# Patient Record
Sex: FEMALE | Race: WHITE | Employment: FULL TIME | URBAN - METROPOLITAN AREA
[De-identification: names, ages, dates, MRNs, and addresses within clinical notes are randomized per-mention and may not be internally consistent; named-entity substitution may affect disease eponyms.]

---

## 2019-02-19 ENCOUNTER — OFFICE VISIT (OUTPATIENT)
Dept: PRIMARY CARE CLINIC | Age: 49
End: 2019-02-19

## 2019-02-19 VITALS
DIASTOLIC BLOOD PRESSURE: 72 MMHG | BODY MASS INDEX: 38.73 KG/M2 | WEIGHT: 241 LBS | RESPIRATION RATE: 12 BRPM | SYSTOLIC BLOOD PRESSURE: 121 MMHG | HEIGHT: 66 IN | HEART RATE: 68 BPM | TEMPERATURE: 98 F

## 2019-02-19 DIAGNOSIS — Z23 NEED FOR TETANUS, DIPHTHERIA, AND ACELLULAR PERTUSSIS (TDAP) VACCINE: Primary | ICD-10-CM

## 2022-05-21 ENCOUNTER — HOSPITAL ENCOUNTER (INPATIENT)
Age: 52
LOS: 1 days | Discharge: HOME | DRG: 603 | End: 2022-05-23
Attending: EMERGENCY MEDICINE | Admitting: STUDENT IN AN ORGANIZED HEALTH CARE EDUCATION/TRAINING PROGRAM
Payer: COMMERCIAL
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
## 2022-05-21 DIAGNOSIS — L08.9 INFECTED DOG BITE: Primary | ICD-10-CM

## 2022-05-21 DIAGNOSIS — L02.415 ABSCESS OF RIGHT THIGH: ICD-10-CM

## 2022-05-21 PROBLEM — L02.91 ABSCESS: Status: ACTIVE | Noted: 2022-05-21

## 2022-05-21 PROBLEM — L03.90 CELLULITIS: Status: ACTIVE | Noted: 2022-05-21

## 2022-05-21 LAB
A/G RATIO: 1.7 (ref 1.1–2.2)
ALBUMIN SERPL-MCNC: 4.6 G/DL (ref 3.4–5)
ALP BLD-CCNC: 84 U/L (ref 40–129)
ALT SERPL-CCNC: 11 U/L (ref 10–40)
ANION GAP SERPL CALCULATED.3IONS-SCNC: 15 MMOL/L (ref 3–16)
AST SERPL-CCNC: 14 U/L (ref 15–37)
BASOPHILS ABSOLUTE: 0 K/UL (ref 0–0.2)
BASOPHILS RELATIVE PERCENT: 0.4 %
BILIRUB SERPL-MCNC: 0.3 MG/DL (ref 0–1)
BUN BLDV-MCNC: 16 MG/DL (ref 7–20)
CALCIUM SERPL-MCNC: 9.4 MG/DL (ref 8.3–10.6)
CHLORIDE BLD-SCNC: 102 MMOL/L (ref 99–110)
CO2: 22 MMOL/L (ref 21–32)
CREAT SERPL-MCNC: 0.8 MG/DL (ref 0.6–1.1)
EOSINOPHILS ABSOLUTE: 0.1 K/UL (ref 0–0.6)
EOSINOPHILS RELATIVE PERCENT: 0.9 %
GFR AFRICAN AMERICAN: >60
GFR NON-AFRICAN AMERICAN: >60
GLUCOSE BLD-MCNC: 96 MG/DL (ref 70–99)
HCT VFR BLD CALC: 40.2 % (ref 36–48)
HEMOGLOBIN: 13.2 G/DL (ref 12–16)
LACTIC ACID: 0.8 MMOL/L (ref 0.4–2)
LYMPHOCYTES ABSOLUTE: 1.3 K/UL (ref 1–5.1)
LYMPHOCYTES RELATIVE PERCENT: 13.8 %
MCH RBC QN AUTO: 29 PG (ref 26–34)
MCHC RBC AUTO-ENTMCNC: 33 G/DL (ref 31–36)
MCV RBC AUTO: 88.1 FL (ref 80–100)
MONOCYTES ABSOLUTE: 0.6 K/UL (ref 0–1.3)
MONOCYTES RELATIVE PERCENT: 6.5 %
NEUTROPHILS ABSOLUTE: 7.4 K/UL (ref 1.7–7.7)
NEUTROPHILS RELATIVE PERCENT: 78.4 %
PDW BLD-RTO: 14.3 % (ref 12.4–15.4)
PLATELET # BLD: 211 K/UL (ref 135–450)
PMV BLD AUTO: 8 FL (ref 5–10.5)
POTASSIUM SERPL-SCNC: 3.8 MMOL/L (ref 3.5–5.1)
RBC # BLD: 4.56 M/UL (ref 4–5.2)
SODIUM BLD-SCNC: 139 MMOL/L (ref 136–145)
TOTAL PROTEIN: 7.3 G/DL (ref 6.4–8.2)
WBC # BLD: 9.5 K/UL (ref 4–11)

## 2022-05-21 PROCEDURE — 87040 BLOOD CULTURE FOR BACTERIA: CPT

## 2022-05-21 PROCEDURE — 87077 CULTURE AEROBIC IDENTIFY: CPT

## 2022-05-21 PROCEDURE — 87205 SMEAR GRAM STAIN: CPT

## 2022-05-21 PROCEDURE — 99285 EMERGENCY DEPT VISIT HI MDM: CPT

## 2022-05-21 PROCEDURE — 87070 CULTURE OTHR SPECIMN AEROBIC: CPT

## 2022-05-21 PROCEDURE — 6360000002 HC RX W HCPCS: Performed by: PHYSICIAN ASSISTANT

## 2022-05-21 PROCEDURE — 96365 THER/PROPH/DIAG IV INF INIT: CPT

## 2022-05-21 PROCEDURE — 83605 ASSAY OF LACTIC ACID: CPT

## 2022-05-21 PROCEDURE — 85025 COMPLETE CBC W/AUTO DIFF WBC: CPT

## 2022-05-21 PROCEDURE — 2500000003 HC RX 250 WO HCPCS: Performed by: PHYSICIAN ASSISTANT

## 2022-05-21 PROCEDURE — 2580000003 HC RX 258: Performed by: PHYSICIAN ASSISTANT

## 2022-05-21 PROCEDURE — 80053 COMPREHEN METABOLIC PANEL: CPT

## 2022-05-21 PROCEDURE — 0H9HXZZ DRAINAGE OF RIGHT UPPER LEG SKIN, EXTERNAL APPROACH: ICD-10-PCS | Performed by: EMERGENCY MEDICINE

## 2022-05-21 PROCEDURE — 36415 COLL VENOUS BLD VENIPUNCTURE: CPT

## 2022-05-21 RX ORDER — 0.9 % SODIUM CHLORIDE 0.9 %
30 INTRAVENOUS SOLUTION INTRAVENOUS ONCE
Status: COMPLETED | OUTPATIENT
Start: 2022-05-21 | End: 2022-05-21

## 2022-05-21 RX ADMIN — SODIUM CHLORIDE 3525 ML: 9 INJECTION, SOLUTION INTRAVENOUS at 21:52

## 2022-05-21 RX ADMIN — SODIUM CHLORIDE 3000 MG: 900 INJECTION INTRAVENOUS at 21:54

## 2022-05-21 RX ADMIN — LIDOCAINE HYDROCHLORIDE 5 ML: 10 INJECTION, SOLUTION EPIDURAL; INFILTRATION; INTRACAUDAL; PERINEURAL at 22:36

## 2022-05-21 ASSESSMENT — PAIN DESCRIPTION - ORIENTATION
ORIENTATION: RIGHT;INNER
ORIENTATION: RIGHT;UPPER

## 2022-05-21 ASSESSMENT — PAIN SCALES - GENERAL
PAINLEVEL_OUTOF10: 3
PAINLEVEL_OUTOF10: 3

## 2022-05-21 ASSESSMENT — PAIN DESCRIPTION - LOCATION
LOCATION: LEG
LOCATION: LEG

## 2022-05-21 ASSESSMENT — PAIN - FUNCTIONAL ASSESSMENT: PAIN_FUNCTIONAL_ASSESSMENT: 0-10

## 2022-05-21 ASSESSMENT — PAIN DESCRIPTION - DESCRIPTORS: DESCRIPTORS: ACHING

## 2022-05-22 LAB
ANION GAP SERPL CALCULATED.3IONS-SCNC: 11 MMOL/L (ref 3–16)
BASOPHILS ABSOLUTE: 0 K/UL (ref 0–0.2)
BASOPHILS RELATIVE PERCENT: 0.3 %
BUN BLDV-MCNC: 10 MG/DL (ref 7–20)
CALCIUM SERPL-MCNC: 8.5 MG/DL (ref 8.3–10.6)
CHLORIDE BLD-SCNC: 108 MMOL/L (ref 99–110)
CO2: 23 MMOL/L (ref 21–32)
CREAT SERPL-MCNC: 0.6 MG/DL (ref 0.6–1.1)
EOSINOPHILS ABSOLUTE: 0.1 K/UL (ref 0–0.6)
EOSINOPHILS RELATIVE PERCENT: 1.2 %
GFR AFRICAN AMERICAN: >60
GFR NON-AFRICAN AMERICAN: >60
GLUCOSE BLD-MCNC: 88 MG/DL (ref 70–99)
HCT VFR BLD CALC: 34.2 % (ref 36–48)
HEMOGLOBIN: 11.5 G/DL (ref 12–16)
LYMPHOCYTES ABSOLUTE: 1.1 K/UL (ref 1–5.1)
LYMPHOCYTES RELATIVE PERCENT: 16.2 %
MAGNESIUM: 2.1 MG/DL (ref 1.8–2.4)
MCH RBC QN AUTO: 29.4 PG (ref 26–34)
MCHC RBC AUTO-ENTMCNC: 33.5 G/DL (ref 31–36)
MCV RBC AUTO: 87.8 FL (ref 80–100)
MONOCYTES ABSOLUTE: 0.6 K/UL (ref 0–1.3)
MONOCYTES RELATIVE PERCENT: 8.3 %
NEUTROPHILS ABSOLUTE: 4.9 K/UL (ref 1.7–7.7)
NEUTROPHILS RELATIVE PERCENT: 74 %
PDW BLD-RTO: 14.2 % (ref 12.4–15.4)
PLATELET # BLD: 176 K/UL (ref 135–450)
PMV BLD AUTO: 7.9 FL (ref 5–10.5)
POTASSIUM SERPL-SCNC: 3.9 MMOL/L (ref 3.5–5.1)
RBC # BLD: 3.9 M/UL (ref 4–5.2)
SODIUM BLD-SCNC: 142 MMOL/L (ref 136–145)
WBC # BLD: 6.6 K/UL (ref 4–11)

## 2022-05-22 PROCEDURE — 6360000002 HC RX W HCPCS: Performed by: INTERNAL MEDICINE

## 2022-05-22 PROCEDURE — 10060 I&D ABSCESS SIMPLE/SINGLE: CPT

## 2022-05-22 PROCEDURE — 6360000002 HC RX W HCPCS: Performed by: STUDENT IN AN ORGANIZED HEALTH CARE EDUCATION/TRAINING PROGRAM

## 2022-05-22 PROCEDURE — 85025 COMPLETE CBC W/AUTO DIFF WBC: CPT

## 2022-05-22 PROCEDURE — 1200000000 HC SEMI PRIVATE

## 2022-05-22 PROCEDURE — 2580000003 HC RX 258: Performed by: STUDENT IN AN ORGANIZED HEALTH CARE EDUCATION/TRAINING PROGRAM

## 2022-05-22 PROCEDURE — G0378 HOSPITAL OBSERVATION PER HR: HCPCS

## 2022-05-22 PROCEDURE — 80048 BASIC METABOLIC PNL TOTAL CA: CPT

## 2022-05-22 PROCEDURE — 94760 N-INVAS EAR/PLS OXIMETRY 1: CPT

## 2022-05-22 PROCEDURE — 36415 COLL VENOUS BLD VENIPUNCTURE: CPT

## 2022-05-22 PROCEDURE — 83735 ASSAY OF MAGNESIUM: CPT

## 2022-05-22 RX ORDER — ONDANSETRON 2 MG/ML
4 INJECTION INTRAMUSCULAR; INTRAVENOUS EVERY 6 HOURS PRN
Status: DISCONTINUED | OUTPATIENT
Start: 2022-05-22 | End: 2022-05-23 | Stop reason: HOSPADM

## 2022-05-22 RX ORDER — ACETAMINOPHEN 325 MG/1
650 TABLET ORAL EVERY 6 HOURS PRN
Status: DISCONTINUED | OUTPATIENT
Start: 2022-05-22 | End: 2022-05-23 | Stop reason: HOSPADM

## 2022-05-22 RX ORDER — HYDROCODONE BITARTRATE AND ACETAMINOPHEN 7.5; 325 MG/1; MG/1
1 TABLET ORAL EVERY 6 HOURS PRN
Status: DISCONTINUED | OUTPATIENT
Start: 2022-05-22 | End: 2022-05-22

## 2022-05-22 RX ORDER — ONDANSETRON 2 MG/ML
4 INJECTION INTRAMUSCULAR; INTRAVENOUS EVERY 6 HOURS PRN
Status: DISCONTINUED | OUTPATIENT
Start: 2022-05-22 | End: 2022-05-22 | Stop reason: SDUPTHER

## 2022-05-22 RX ORDER — SODIUM CHLORIDE 0.9 % (FLUSH) 0.9 %
5-40 SYRINGE (ML) INJECTION PRN
Status: DISCONTINUED | OUTPATIENT
Start: 2022-05-22 | End: 2022-05-23 | Stop reason: HOSPADM

## 2022-05-22 RX ORDER — POLYETHYLENE GLYCOL 3350 17 G/17G
17 POWDER, FOR SOLUTION ORAL DAILY PRN
Status: DISCONTINUED | OUTPATIENT
Start: 2022-05-22 | End: 2022-05-23 | Stop reason: HOSPADM

## 2022-05-22 RX ORDER — HYDROCODONE BITARTRATE AND ACETAMINOPHEN 5; 325 MG/1; MG/1
1 TABLET ORAL EVERY 6 HOURS PRN
Status: DISCONTINUED | OUTPATIENT
Start: 2022-05-22 | End: 2022-05-23 | Stop reason: HOSPADM

## 2022-05-22 RX ORDER — SODIUM CHLORIDE 0.9 % (FLUSH) 0.9 %
5-40 SYRINGE (ML) INJECTION EVERY 12 HOURS SCHEDULED
Status: DISCONTINUED | OUTPATIENT
Start: 2022-05-22 | End: 2022-05-23 | Stop reason: HOSPADM

## 2022-05-22 RX ORDER — SODIUM CHLORIDE 9 MG/ML
INJECTION, SOLUTION INTRAVENOUS PRN
Status: DISCONTINUED | OUTPATIENT
Start: 2022-05-22 | End: 2022-05-23 | Stop reason: HOSPADM

## 2022-05-22 RX ORDER — ACETAMINOPHEN 650 MG/1
650 SUPPOSITORY RECTAL EVERY 6 HOURS PRN
Status: DISCONTINUED | OUTPATIENT
Start: 2022-05-22 | End: 2022-05-23 | Stop reason: HOSPADM

## 2022-05-22 RX ORDER — ENOXAPARIN SODIUM 100 MG/ML
30 INJECTION SUBCUTANEOUS 2 TIMES DAILY
Status: DISCONTINUED | OUTPATIENT
Start: 2022-05-22 | End: 2022-05-23 | Stop reason: HOSPADM

## 2022-05-22 RX ADMIN — AMPICILLIN AND SULBACTAM 3000 MG: 1; 2 INJECTION, POWDER, FOR SOLUTION INTRAMUSCULAR; INTRAVENOUS at 03:35

## 2022-05-22 RX ADMIN — AMPICILLIN AND SULBACTAM 3000 MG: 1; 2 INJECTION, POWDER, FOR SOLUTION INTRAMUSCULAR; INTRAVENOUS at 10:30

## 2022-05-22 RX ADMIN — AMPICILLIN AND SULBACTAM 3000 MG: 1; 2 INJECTION, POWDER, FOR SOLUTION INTRAMUSCULAR; INTRAVENOUS at 17:19

## 2022-05-22 RX ADMIN — SODIUM CHLORIDE 25 ML: 9 INJECTION, SOLUTION INTRAVENOUS at 10:29

## 2022-05-22 RX ADMIN — ENOXAPARIN SODIUM 30 MG: 100 INJECTION SUBCUTANEOUS at 20:27

## 2022-05-22 RX ADMIN — Medication 1250 MG: at 11:47

## 2022-05-22 RX ADMIN — Medication 1250 MG: at 23:53

## 2022-05-22 RX ADMIN — Medication 10 ML: at 20:28

## 2022-05-22 RX ADMIN — Medication 10 ML: at 10:25

## 2022-05-22 RX ADMIN — ENOXAPARIN SODIUM 30 MG: 100 INJECTION SUBCUTANEOUS at 10:24

## 2022-05-22 RX ADMIN — SODIUM CHLORIDE 25 ML: 9 INJECTION, SOLUTION INTRAVENOUS at 17:17

## 2022-05-22 RX ADMIN — AMPICILLIN AND SULBACTAM 3000 MG: 1; 2 INJECTION, POWDER, FOR SOLUTION INTRAMUSCULAR; INTRAVENOUS at 23:00

## 2022-05-22 ASSESSMENT — ENCOUNTER SYMPTOMS: COLOR CHANGE: 1

## 2022-05-22 ASSESSMENT — PAIN SCALES - GENERAL
PAINLEVEL_OUTOF10: 0
PAINLEVEL_OUTOF10: 0

## 2022-05-22 NOTE — PROGRESS NOTES
Hospitalist Progress Note      PCP: No primary care provider on file. Date of Admission: 5/21/2022        Subjective: No fever or chills still having thigh pain, no nausea vomiting dizziness. Medications:  Reviewed    Infusion Medications    sodium chloride       Scheduled Medications    sodium chloride flush  5-40 mL IntraVENous 2 times per day    enoxaparin  30 mg SubCUTAneous BID    ampicillin-sulbactam  3,000 mg IntraVENous Q6H     PRN Meds: sodium chloride flush, sodium chloride, polyethylene glycol, acetaminophen **OR** acetaminophen, ondansetron, HYDROcodone 5 mg - acetaminophen      Intake/Output Summary (Last 24 hours) at 5/22/2022 0920  Last data filed at 5/22/2022 0916  Gross per 24 hour   Intake 0 ml   Output --   Net 0 ml       Physical Exam Performed:    /65   Pulse 80   Temp 98.5 °F (36.9 °C) (Oral)   Resp 18   Wt 265 lb 3.4 oz (120.3 kg)   SpO2 95%   BMI 42.81 kg/m²     General appearance: No apparent distress  Neck: Supple  Respiratory:  Normal respiratory effort. Clear to auscultation, bilaterally without Rales/Wheezes/Rhonchi. Cardiovascular: Regular rate and rhythm with normal S1/S2 without murmurs, rubs or gallops. Abdomen: Soft, non-tender, non-distended   Musculoskeletal: No clubbing, cyanosis. Edema and erythema of medial aspect of right thigh serous drainage no bloody drainage.   Skin: As above  Neurologic: No focal weakness  Psychiatric: Alert and oriented, thought content appropriate, normal insight  Capillary Refill: Brisk,3 seconds, normal   Peripheral Pulses: +2 palpable, equal bilaterally       Labs:   Recent Labs     05/21/22 2122 05/22/22  0505   WBC 9.5 6.6   HGB 13.2 11.5*   HCT 40.2 34.2*    176     Recent Labs     05/21/22 2122 05/22/22  0504    142   K 3.8 3.9    108   CO2 22 23   BUN 16 10   CREATININE 0.8 0.6   CALCIUM 9.4 8.5     Recent Labs     05/21/22 2122   AST 14*   ALT 11   BILITOT 0.3   ALKPHOS 84     No results for

## 2022-05-22 NOTE — H&P
Hospital Medicine History & Physical      PCP: No primary care provider on file. Date of Admission: 5/21/2022    Date of Service: Pt seen/examined on 5/21/2022 and placed observation    Chief Complaint: Right medial thigh erythema and swelling, worsening recently      History Of Present Illness: The patient is a 45 y.o. female with no major past medical history who presents to [facility] with worsening right medial thigh swelling and erythema as well as area that seems to be more protuberant than previous that she notes was initially caused by a dog bite from her home dog that she notes was not done intentionally and was just an accident about 2 or 3 weeks ago. She notes that the area did get inflamed and red at the time and did potentially start draining pus at the time but she treated it with home dose of Keflex which she had without a prescription at home. She notes that the area did improve over the course of time for a week or so but then more recently in the last 2 to 3 days she has noticed increasing swelling again and erythema to the area and she is also noticed some areas where it started to become more protuberant of concern for an abscess to her. Otherwise in the last few weeks she has not had any other recent symptoms of fever, chills, dizziness, syncope, chest pain, shortness of breath, dysuria, blood in urine/stool/BM, nausea/vomiting/diarrhea/abdominal pain. Past Medical History:    History reviewed. No pertinent past medical history. Past Surgical History:        Procedure Laterality Date    HYSTERECTOMY         Medications Prior to Admission:    Prior to Admission medications    Not on File       Allergies:  Patient has no known allergies.     Social History:  The patient currently lives home    TOBACCO:   reports that she has never smoked. She has never used smokeless tobacco.  ETOH:   reports current alcohol use. Family History:  Reviewed in detail and negative for DM, Early CAD, Cancer, CVA. Positive as follows:    History reviewed. No pertinent family history. REVIEW OF SYSTEMS:   as noted in the HPI. All other systems reviewed and negative. PHYSICAL EXAM:    /65   Pulse 80   Temp 98.5 °F (36.9 °C) (Oral)   Resp 18   Wt 265 lb 3.4 oz (120.3 kg)   SpO2 95%   BMI 42.81 kg/m²     General appearance: Alert and oriented x4, no acute respiratory distress, pleasant, conversational, still having pain to the leg  HEENT Normal cephalic, atraumatic without obvious deformity. Pupils equal, round, and reactive to light. Extra ocular muscles intact. Conjunctivae/corneas clear. Moist mucous membrane  Neck: Supple, no JVD  Lungs: Clear to auscultation, bilaterally without Rales/Wheezes/Rhonchi with good respiratory effort. Heart: Regular rate and rhythm with Normal S1/S2 without murmurs, rubs or gallops, point of maximum impulse non-displaced  Abdomen: Soft, non-tender or non-distended without rigidity or guarding and positive bowel sounds all four quadrants. Extremities: No lower extremity edema noted  Skin: To the right medial thigh there is circumferential erythema that seems to be improving and 2 areas of puncture wounds were noted that where the previous abscess drainage sites and they do have some sterile packing in place. Does not seem to be any excess pus draining out of the area and overall seems to be improving  Neurologic: Grossly intact neurologically, 5 of 5 strength all extremities  Mental status: Alert, oriented, thought content appropriate.   Capillary Refill: Acceptable  < 3 seconds  Peripheral Pulses: +3 Easily felt, not easily obliterated with pressure          CBC   Recent Labs     05/21/22 2122 05/22/22  0505   WBC 9.5 6.6   HGB 13.2 11.5*   HCT 40.2 34.2*    176      RENAL  Recent Labs 05/21/22 2122 05/22/22  0504    142   K 3.8 3.9    108   CO2 22 23   BUN 16 10   CREATININE 0.8 0.6     LFT'S  Recent Labs     05/21/22 2122   AST 14*   ALT 11   BILITOT 0.3   ALKPHOS 84     COAG  No results for input(s): INR in the last 72 hours. CARDIAC ENZYMES  No results for input(s): CKTOTAL, CKMB, CKMBINDEX, TROPONINI in the last 72 hours. U/A:  No results found for: NITRITE, COLORU, WBCUA, RBCUA, MUCUS, BACTERIA, CLARITYU, SPECGRAV, LEUKOCYTESUR, BLOODU, GLUCOSEU, AMORPHOUS    ABG  No results found for: [place], BEART, W2UPSDYM, PHART, THGBART, Leeta Larch, [place]        Active Hospital Problems    Diagnosis Date Noted    Cellulitis [L03.90] 05/21/2022     Priority: Medium    Dog bite [N00. 0XXA] 05/21/2022     Priority: Medium    Abscess [L02.91] 05/21/2022     Priority: Medium         PHYSICIANS CERTIFICATION:    I certify that Shyanne Hilliard is expected to be hospitalized for less than 2 midnights based on the following assessment and plan:      ASSESSMENT/PLAN:  · Cellulitis  · Abscess  · Dog bite    Plan:  · Abscess was drained in the ED although there is some concern for persistent cellulitis and potentially a deeper abscess  · Start patient on Unasyn IV  · Keeping patient n.p.o.  · General surgery to evaluate in the morning to consider possibly further abscess debridement  · Wound cultures were obtained from the abscess drainage  · Norco as needed for superficial pain to the abscess site  · Repeat labs daily    DVT Prophylaxis: Lovenox  Diet: Diet NPO Exceptions are: Ice Chips, Sips of Water with Meds  Code Status: Full Code  PT/OT Eval Status: Ambulatory    Dispo -pending clinical course       Alton Gruber Estimable, DO    Thank you No primary care provider on file. for the opportunity to be involved in this patient's care. If you have any questions or concerns please feel free to contact me at [phone].

## 2022-05-22 NOTE — PROGRESS NOTES
Clinical Pharmacy Note  Vancomycin Consult    Jose M Boo is a 45 y.o. female ordered Vancomycin for Skin/soft tissue; consult received from Dr. Roman Valdez to manage therapy. Also receiving Unasyn. Patient Active Problem List   Diagnosis    Cellulitis    Dog bite    Abscess       Allergies:  Patient has no known allergies. Temp max:  Temp (24hrs), Av.2 °F (36.8 °C), Min:98 °F (36.7 °C), Max:98.5 °F (36.9 °C)      Recent Labs     22  0505   WBC 9.5 6.6       Recent Labs     22  0504   BUN 16 10   CREATININE 0.8 0.6         Intake/Output Summary (Last 24 hours) at 2022 1021  Last data filed at 2022 7192  Gross per 24 hour   Intake 0 ml   Output --   Net 0 ml       Culture Results:  Wound + Blood Cultures ordered       Ht Readings from Last 1 Encounters:   19 5' 6\" (1.676 m)        Wt Readings from Last 1 Encounters:   22 265 lb 3.4 oz (120.3 kg)         CrCl cannot be calculated (Unknown ideal weight. ). Assessment/Plan:  Vancomycin 1250 mg IV every 12 hours ordered. Regimen projects a trough level of 10-15 mg/L. Calculated   Trough 12.6 ug/mL  Level ordered for 22 1000. Thank you for the consult.    Seb Saab, [facility]

## 2022-05-22 NOTE — ED PROVIDER NOTES
I have personally performed a face to face diagnostic evaluation on this patient. I have fully participated in the care of this patient I personally saw the patient and performed a substantive portion of the visit including all aspects of the medical decision making. I have reviewed and agree with all pertinent clinical information including history, physical exam, diagnostic tests, and the plan. HPI: Felicia Ambriz presented with right inner thigh wound from a dog bite that occurred approximately 6 weeks ago now getting swollen and painful with some redness. Bloody drainage no purulence. Was taking Keflex at home for home treatment but has not seen medical care at this time. No other systemic symptoms. See CAMERON note for further history  Chief Complaint   Patient presents with    Wound Check     right inner thigh leg wound from pt dog bite on 4/16 that was self treated at home and last night pt noticed it was gettin gswollen and painful      Review of Systems: See CAMERON note  Vital Signs: BP (!) 152/98   Pulse 108   Temp 98.4 °F (36.9 °C) (Oral)   Resp 16   Wt 259 lb 0.7 oz (117.5 kg)   SpO2 100%   BMI 41.81 kg/m²     Alert 45 y.o. female who does not appear toxic or acutely ill  HENT: Atraumatic, oral mucosa moist  Neck: Grossly normal ROM  Chest/Lungs: respiratory effort normal   Abdomen: Soft nontender  Extremities: 2+ radial DP and PT bilaterally  Musculoskeletal: Grossly normal ROM  Skin: No palor or diaphoresis, large area of erythema in right thigh with multiple areas of fluctuance and induration expanding around the previously marked area significant tenderness    Medical Decision Making and Plan:  Pertinent Labs & Imaging studies reviewed. (See CAMERON chart for details)  I agree with CAMERON assessment and plan. Side ultrasound shows multiple abscess pockets which will be opened and drained. See CAMERON note for further details on procedures.   Patient's white blood cell count and lactate is normal however the erythema on her right inner thigh is rapidly expanding. No concern for necrotizing fasciitis at this time however patient with worsening cellulitis and abscess will admit for IV antibiotics and further observation.          Reed Mcginnis MD  05/21/22 9873

## 2022-05-22 NOTE — PROGRESS NOTES
General Surgery    Asked to see for cellulitis and abscess right thigh  Due to dog bite    I&D done in ER last night    Continue IV antibiotics  Will check in AM    If surgical intervention is required we can probably delay until Tuesday to allow her to attend her daughter's graduation Monday evening    NPO after MN in case more urgent intervention is needed    Electronically signed by Arlyn Jimenez MD on 5/22/2022 at 10:25 AM

## 2022-05-22 NOTE — ED PROVIDER NOTES
[address] [facility]      Pt Name: Ramonita Almaraz  MRN: [id number]  [place] 1970  Date of evaluation: 5/21/2022  Provider: NISA Watt    This patient was seen and evaluated by the attending physician Ramo Watts MD.    [address] [place] [address]       Chief Complaint   Patient presents with   Elena Wound Check     right inner thigh leg wound from pt dog bite on 4/16 that was self treated at home and last night pt noticed it was gettin gswollen and painful       CRITICAL CARE TIME   I performed a total Critical Care time of 31 minutes, excluding separately reportable procedures. There was a high probability of clinically significant/life threatening deterioration in the patient's condition which required my urgent intervention. Not limited to multiple reexaminations, discussions with attending physician and consultants. HISTORY OF PRESENT ILLNESS  (Location/Symptom, Timing/Onset, Context/Setting, Quality, Duration, Modifying Factors, Severity.)   Ramonita Almaraz is a 45 y.o. female who presents to the emergency department with an infection and wound to her right upper medial thigh. She tells me 5 weeks ago she was stepping over a gate in her home when her 1 dog lunged at her other dog and ended up biting her in the thigh. She states there was 1 long wound in the thigh and she treated at home with heat, soapy scrubs and Keflex which she had 7 days worth of and took. She states initially it seemed to be significantly improving the wound was closing. There was an area that was draining some bloody clear fluid underneath the actual dog bite she states it stopped draining and then yesterday she noticed some redness that got rapidly worse this afternoon with swelling. She is not a diabetic she states her tetanus is up-to-date and her dog is up-to-date on immunizations. She denies chronic medical problems. No fevers no vomiting.   Took some ibuprofen earlier for the pain that she rates at 3 out of 10. Nursing Notes were reviewed and I agree. REVIEW OF SYSTEMS    (2-9 systems for level 4, 10 or more for level 5)     Review of Systems   Constitutional: Negative for fever. Musculoskeletal: Positive for arthralgias. Skin: Positive for color change and wound. Neurological: Negative for weakness and numbness. Psychiatric/Behavioral: Negative for agitation, behavioral problems and confusion. Except as noted above the remainder of the review of systems was reviewed and negative. PAST MEDICAL HISTORY   History reviewed. No pertinent past medical history. SURGICAL HISTORY           Procedure Laterality Date    HYSTERECTOMY         CURRENT MEDICATIONS       Previous Medications    No medications on file       ALLERGIES     Patient has no known allergies. FAMILY HISTORY     History reviewed. No pertinent family history. No family status information on file. SOCIAL HISTORY      reports that she has never smoked. She has never used smokeless tobacco. She reports current alcohol use. She reports that she does not use drugs. PHYSICAL EXAM    (up to 7 for level 4, 8 or more for level 5)     ED Triage Vitals [05/21/22 2046]   BP Temp Temp Source Pulse Resp SpO2 Height Weight   (!) 152/98 98.4 °F (36.9 °C) Oral 108 16 100 % -- 259 lb 0.7 oz (117.5 kg)       Physical Exam  Vitals and nursing note reviewed. Constitutional:       Appearance: Normal appearance. HENT:      Head: Normocephalic and atraumatic. Mouth/Throat:      Mouth: Mucous membranes are moist.   Eyes:      Pupils: Pupils are equal, round, and reactive to light. Cardiovascular:      Rate and Rhythm: Tachycardia present. Pulmonary:      Effort: Pulmonary effort is normal. No respiratory distress. Musculoskeletal:      Cervical back: Normal range of motion. Skin:     Findings: Erythema present.           Neurological:      General: No focal deficit present. Mental Status: She is alert and oriented to person, place, and time. Psychiatric:         Mood and Affect: Mood normal.         Behavior: Behavior normal.         DIAGNOSTIC RESULTS     NONE    LABS:  Labs Reviewed   COMPREHENSIVE METABOLIC PANEL - Abnormal; Notable for the following components:       Result Value    AST 14 (*)     All other components within normal limits   CULTURE, BLOOD 2   CULTURE, BLOOD 1   CULTURE, WOUND   CBC WITH AUTO DIFFERENTIAL   LACTIC ACID       All other labs were within normal range or not returned as of this dictation. EMERGENCY DEPARTMENT COURSE and DIFFERENTIAL DIAGNOSIS/MDM:   Vitals:    Vitals:    05/21/22 2046 05/22/22 0000   BP: (!) 152/98 139/89   Pulse: 108 98   Resp: 16 16   Temp: 98.4 °F (36.9 °C) 98 °F (36.7 °C)   TempSrc: Oral Oral   SpO2: 100% 99%   Weight: 259 lb 0.7 oz (117.5 kg)      Is afebrile initially tachycardic at 108 on recheck is not tachycardic. She has a rapidly worsening abscess and cellulitis to the right inner thigh. It got better over the past 5 weeks and then got much worse since yesterday she states really since this afternoon got much more swollen and red. It was anesthetized and opened into areas with packing placed. Moderate to large amount of purulent drainage. We will send for culture. White count and lactic are not elevated. No involvement of the genitalia. Not a diabetic. Will consult hospitalist regarding admission. She is given IV Unasyn in the ER. Patient is agreeable. CONSULTS:  IP CONSULT TO HOSPITALIST  IP CONSULT TO HOSPITALIST  IP CONSULT TO GENERAL SURGERY    PROCEDURES:  Incision/Drainage    Date/Time: 5/22/2022 12:35 AM  Performed by: NISA Kerr  Authorized by:  Ashlee Hernández DO     Consent:     Consent obtained:  Verbal    Consent given by:  Patient  Location:     Type:  Abscess    Location:  Lower extremity    Lower extremity location:  Leg    Leg location:  R upper leg  Pre-procedure details:     Skin preparation:  Hibiclens  Anesthesia (see MAR for exact dosages): Anesthesia method:  Local infiltration    Local anesthetic:  Lidocaine 1% w/o epi  Procedure type:     Complexity:  Simple  Procedure details:     Incision types:  Single straight    Scalpel blade:  11    Wound management:  Probed and deloculated and irrigated with saline    Drainage:  Purulent    Drainage amount: Moderate    Packing materials:  1/4 in gauze  Post-procedure details:     Patient tolerance of procedure: Tolerated well, no immediate complications    Incision/Drainage    Date/Time: 5/22/2022 12:36 AM  Performed by: NISA Luis  Authorized by: Heather Lerner DO     Consent:     Consent obtained:  Verbal    Consent given by:  Patient  Location:     Type:  Abscess    Location:  Lower extremity    Lower extremity location:  Leg    Leg location:  R upper leg  Pre-procedure details:     Skin preparation:  Hibiclens  Anesthesia (see MAR for exact dosages): Anesthesia method:  Local infiltration    Local anesthetic:  Lidocaine 1% w/o epi  Procedure details:     Incision types:  Single straight    Scalpel blade:  11    Drainage:  Purulent    Drainage amount: Moderate    Packing materials:  1/4 in gauze  Post-procedure details:     Patient tolerance of procedure: Tolerated well, no immediate complications          FINAL IMPRESSION      1. Infected dog bite    2. Abscess of right thigh          DISPOSITION/PLAN   DISPOSITION Admitted 05/21/2022 11:53:21 PM      PATIENT REFERRED TO:  No follow-up provider specified.     DISCHARGE MEDICATIONS:  New Prescriptions    No medications on file       (Please note that portions of this note were completed with a voice recognition program.  Efforts were made to edit the dictations but occasionally words are mis-transcribed.)    Nohemy Luis, [id number]  05/22/22 0923

## 2022-05-22 NOTE — PLAN OF CARE
Problem: Discharge Planning  Goal: Discharge to home or other facility with appropriate resources  Outcome: Not Progressing  Note: Pt will be discharged to appropriate level of care. Plan is to return home. SW and medical team are following. Problem: Pain  Goal: Verbalizes/displays adequate comfort level or baseline comfort level  Outcome: Not Progressing  Note: Pt assessed for pain. Pt denies any pain at this time. Will continue to monitor pt and assess for pain throughout rest of shift.

## 2022-05-22 NOTE — PROGRESS NOTES
Patient admitted to room 3104 dx of Cellulitis from dog bite. Currently resting in bed. Alert and oriented X 4. Pt Up ad sagrario. Complaining of pain, PRN pain medication given per order (see MAR). IV capped and flushed. Assessment complete. All patient needs are met at this time. Call light is in reach.

## 2022-05-22 NOTE — PROGRESS NOTES
Pt A&O in the bed. Pt tolerating PO intake well. AM medications administered without complications. Pt has no complaints at this time. UAL in the room. Call light within reach. Able to make needs known. Fall precautions in place. Will monitor.  Electronically signed by Dayana Peterson RN on 5/22/2022 at 3:03 PM

## 2022-05-23 VITALS
WEIGHT: 265.21 LBS | HEART RATE: 65 BPM | RESPIRATION RATE: 16 BRPM | BODY MASS INDEX: 42.62 KG/M2 | OXYGEN SATURATION: 97 % | SYSTOLIC BLOOD PRESSURE: 116 MMHG | TEMPERATURE: 97.6 F | HEIGHT: 66 IN | DIASTOLIC BLOOD PRESSURE: 69 MMHG

## 2022-05-23 LAB
ANION GAP SERPL CALCULATED.3IONS-SCNC: 10 MMOL/L (ref 3–16)
BASOPHILS ABSOLUTE: 0 K/UL (ref 0–0.2)
BASOPHILS RELATIVE PERCENT: 0.4 %
BUN BLDV-MCNC: 7 MG/DL (ref 7–20)
CALCIUM SERPL-MCNC: 8.5 MG/DL (ref 8.3–10.6)
CHLORIDE BLD-SCNC: 108 MMOL/L (ref 99–110)
CO2: 23 MMOL/L (ref 21–32)
CREAT SERPL-MCNC: 0.6 MG/DL (ref 0.6–1.1)
EOSINOPHILS ABSOLUTE: 0.2 K/UL (ref 0–0.6)
EOSINOPHILS RELATIVE PERCENT: 3.6 %
GFR AFRICAN AMERICAN: >60
GFR NON-AFRICAN AMERICAN: >60
GLUCOSE BLD-MCNC: 87 MG/DL (ref 70–99)
HCT VFR BLD CALC: 35.4 % (ref 36–48)
HEMOGLOBIN: 11.8 G/DL (ref 12–16)
LYMPHOCYTES ABSOLUTE: 1.3 K/UL (ref 1–5.1)
LYMPHOCYTES RELATIVE PERCENT: 27.7 %
MAGNESIUM: 2.1 MG/DL (ref 1.8–2.4)
MCH RBC QN AUTO: 29.2 PG (ref 26–34)
MCHC RBC AUTO-ENTMCNC: 33.2 G/DL (ref 31–36)
MCV RBC AUTO: 87.9 FL (ref 80–100)
MONOCYTES ABSOLUTE: 0.5 K/UL (ref 0–1.3)
MONOCYTES RELATIVE PERCENT: 10 %
NEUTROPHILS ABSOLUTE: 2.8 K/UL (ref 1.7–7.7)
NEUTROPHILS RELATIVE PERCENT: 58.3 %
PDW BLD-RTO: 14.2 % (ref 12.4–15.4)
PLATELET # BLD: 191 K/UL (ref 135–450)
PMV BLD AUTO: 7.7 FL (ref 5–10.5)
POTASSIUM SERPL-SCNC: 3.8 MMOL/L (ref 3.5–5.1)
RBC # BLD: 4.03 M/UL (ref 4–5.2)
SODIUM BLD-SCNC: 141 MMOL/L (ref 136–145)
VANCOMYCIN TROUGH: 10.6 UG/ML (ref 10–20)
WBC # BLD: 4.7 K/UL (ref 4–11)

## 2022-05-23 PROCEDURE — 83735 ASSAY OF MAGNESIUM: CPT

## 2022-05-23 PROCEDURE — 99221 1ST HOSP IP/OBS SF/LOW 40: CPT | Performed by: INTERNAL MEDICINE

## 2022-05-23 PROCEDURE — 80202 ASSAY OF VANCOMYCIN: CPT

## 2022-05-23 PROCEDURE — 6360000002 HC RX W HCPCS: Performed by: STUDENT IN AN ORGANIZED HEALTH CARE EDUCATION/TRAINING PROGRAM

## 2022-05-23 PROCEDURE — 80048 BASIC METABOLIC PNL TOTAL CA: CPT

## 2022-05-23 PROCEDURE — 94760 N-INVAS EAR/PLS OXIMETRY 1: CPT

## 2022-05-23 PROCEDURE — 2580000003 HC RX 258: Performed by: STUDENT IN AN ORGANIZED HEALTH CARE EDUCATION/TRAINING PROGRAM

## 2022-05-23 PROCEDURE — 85025 COMPLETE CBC W/AUTO DIFF WBC: CPT

## 2022-05-23 PROCEDURE — 36415 COLL VENOUS BLD VENIPUNCTURE: CPT

## 2022-05-23 PROCEDURE — 6360000002 HC RX W HCPCS: Performed by: INTERNAL MEDICINE

## 2022-05-23 RX ORDER — AMOXICILLIN AND CLAVULANATE POTASSIUM 875; 125 MG/1; MG/1
1 TABLET, FILM COATED ORAL 2 TIMES DAILY
Qty: 28 TABLET | Refills: 0 | Status: SHIPPED | OUTPATIENT
Start: 2022-05-23 | End: 2022-06-06

## 2022-05-23 RX ADMIN — Medication 1250 MG: at 10:32

## 2022-05-23 RX ADMIN — SODIUM CHLORIDE 25 ML: 9 INJECTION, SOLUTION INTRAVENOUS at 08:53

## 2022-05-23 RX ADMIN — AMPICILLIN AND SULBACTAM 3000 MG: 1; 2 INJECTION, POWDER, FOR SOLUTION INTRAMUSCULAR; INTRAVENOUS at 05:00

## 2022-05-23 RX ADMIN — Medication 10 ML: at 08:51

## 2022-05-23 RX ADMIN — AMPICILLIN AND SULBACTAM 3000 MG: 1; 2 INJECTION, POWDER, FOR SOLUTION INTRAMUSCULAR; INTRAVENOUS at 08:55

## 2022-05-23 ASSESSMENT — PAIN SCALES - GENERAL: PAINLEVEL_OUTOF10: 0

## 2022-05-23 NOTE — CARE COORDINATION
INITIAL CASE MANAGEMENT ASSESSMENT     Reviewed chart, spoke with patient to assess possible discharge needs. Explained Case Management role/services. Living Situation: Verified demographics. Patient lives with spouse and 3 daughters in a house. Patient reports no concerns regarding mobility in the home. ADLs: Independent      DME: none     PT/OT Recs: not ordered; patient independent      Active Services: none      Transportation: Family transport at discharge      Medications: [place] [facility] [employer]     PCP: [facility] PCP        HD/PD: n/a     PLAN/COMMENTS: Return home with family. ACP Completed. Patient currently receiving IV antibiotics. - Per chart review, plan is to discharge with Oral antibiotics. SW/CM provided contact information for patient or family to call with any questions. SW/CM will follow and assist as needed.     NIKO Garcia, [facility] Social Work  [phone]  Electronically signed by NIKO Garcia, W on 5/23/2022 at 9:57 AM

## 2022-05-23 NOTE — PROGRESS NOTES
General surgery paged in regards to antibiotic recommendations for discharge per hospitalist request. Electronically signed by Melissa Maldonado RN on 5/23/2022 at 9:33 AM

## 2022-05-23 NOTE — PROGRESS NOTES
Progress Note  Date:2022       Room:W9U-8788/3104-01  Patient Name:Sherron Wright     YOB: 1970     Age:51 y.o. Subjective    Subjective:  Symptoms:  Improved. Diet:  Adequate intake. Activity level: Returning to normal.    Pain:  She complains of pain that is mild. Review of Systems  Objective         Vitals Last 24 Hours:  TEMPERATURE:  Temp  Av.2 °F (36.8 °C)  Min: 98 °F (36.7 °C)  Max: 98.6 °F (37 °C)  RESPIRATIONS RANGE: Resp  Av  Min: 16  Max: 18  PULSE OXIMETRY RANGE: SpO2  Av %  Min: 96 %  Max: 96 %  PULSE RANGE: Pulse  Av.3  Min: 61  Max: 88  BLOOD PRESSURE RANGE: Systolic (30GBJ), PDH:861 , Min:118 , THM:940   ; Diastolic (02ZDN), AEE:95, Min:68, Max:98    I/O (24Hr): Intake/Output Summary (Last 24 hours) at 2022 0948  Last data filed at 2022 9427  Gross per 24 hour   Intake 1348.6 ml   Output --   Net 1348.6 ml     Objective  Labs/Imaging/Diagnostics    Labs:  CBC:  Recent Labs     22  0505 22  0447   WBC 9.5 6.6 4.7   RBC 4.56 3.90* 4.03   HGB 13.2 11.5* 11.8*   HCT 40.2 34.2* 35.4*   MCV 88.1 87.8 87.9   RDW 14.3 14.2 14.2    176 191     CHEMISTRIES:  Recent Labs     22  0504 22  0447    142 141   K 3.8 3.9 3.8    108 108   CO2    BUN 16 10 7   CREATININE 0.8 0.6 0.6   GLUCOSE 96 88 87   MG  --  2.10 2.10     PT/INR:No results for input(s): PROTIME, INR in the last 72 hours. APTT:No results for input(s): APTT in the last 72 hours. LIVER PROFILE:  Recent Labs     22   AST 14*   ALT 11   BILITOT 0.3   ALKPHOS 84       Imaging Last 24 Hours:  No results found.   Assessment//Plan           Hospital Problems           Last Modified POA    * (Principal) Cellulitis 2022 Yes    Dog bite 2022 Yes    Abscess 2022 Yes        Assessment & Plan    Right thigh abscess   S/P I&D in ER   Improved overnight   Less erythema, less pain   Packing removed, no purulence    Plan discharge later today on PO antibiotics  Follow up Thursday to ensure ongoing improvement  OR on Friday if not improving    Electronically signed by Otis Tovar MD on 5/23/2022 at 9:49 AM    Electronically signed by Otis Tovar MD on 5/23/22 at 9:48 AM EDT

## 2022-05-23 NOTE — PROGRESS NOTES
Pt A&O in the bed. Pt tolerating PO intake well. AM medications administered without complications. Pt has no complaints of pain at this time. UAL in the room. General surgery cleared patient for discharge with a follow up appointment on Thursday. Hospitalist notified. Pt is ready to be discharged after her AM IV antibiotics. Dressing is clean dry and intact. No drainage noted. Area continues with redness. Call light within reach. Able to make needs known. Fall precautions in place. Will monitor.  Electronically signed by Jonas Calle RN on 5/23/2022 at 9:11 AM

## 2022-05-23 NOTE — PLAN OF CARE
Problem: Discharge Planning  Goal: Discharge to home or other facility with appropriate resources  5/23/2022 0924 by Bob Dunn RN  Outcome: Progressing  Note: Pt will be discharged to appropriate level of care. Plan is to return home. SW and medical team are following.   5/22/2022 2320 by Isabell Núñez RN  Outcome: Progressing  Flowsheets  Taken 5/22/2022 2320  Discharge to home or other facility with appropriate resources:   Identify barriers to discharge with patient and caregiver   Identify discharge learning needs (meds, wound care, etc)   Refer to discharge planning if patient needs post-hospital services based on physician order or complex needs related to functional status, cognitive ability or social support system   Arrange for needed discharge resources and transportation as appropriate  Taken 5/22/2022 2020  Discharge to home or other facility with appropriate resources: Identify barriers to discharge with patient and caregiver     Problem: Pain  Goal: Verbalizes/displays adequate comfort level or baseline comfort level  5/23/2022 0924 by Bob Dunn RN  Outcome: Progressing  Note: Pt able to verbalize comfort level. Will continue to monitor. No complaints at this time. 5/22/2022 2320 by Isabell Núñez RN  Outcome: Progressing  Flowsheets (Taken 5/22/2022 2320)  Verbalizes/displays adequate comfort level or baseline comfort level:   Encourage patient to monitor pain and request assistance   Administer analgesics based on type and severity of pain and evaluate response   Assess pain using appropriate pain scale   Implement non-pharmacological measures as appropriate and evaluate response     Problem: ABCDS Injury Assessment  Goal: Absence of physical injury  5/23/2022 0924 by Bob Dunn RN  Outcome: Progressing  Note: Pt is free of injury. No injury noted. Fall precautions in place. Call light within reach. Will monitor.     5/22/2022 2320 by Isabell Núñez RN  Outcome: Progressing  Flowsheets (Taken 5/22/2022 1026)  Absence of Physical Injury: Implement safety measures based on patient assessment

## 2022-05-23 NOTE — CONSULTS
Infectious Diseases Inpatient Consult Note      Reason for Consult:  Rt medial thigh abscess and cellulitis from Dog bite    Requesting Physician:   Krys Ferrera     Primary Care Physician:  No primary care provider on file. History Obtained From:  Epic and Patient      CHIEF COMPLAINT:     Chief Complaint   Patient presents with    Wound Check     right inner thigh leg wound from pt dog bite on 4/16 that was self treated at home and last night pt noticed it was gettin gswollen and painful         HISTORY OF PRESENT ILLNESS:  45 y.o. Woman   Morbid obesity BMI at  43 other wise no medical issues sustained Rt medial thigh dog bite on 4/16 and was dong local care now admitted with worsening, pain, swelling cellulitis she was seen in ED underwent bed side ID and cx in process was seen by Gen surgery and pt now wanting to leave to see her Daughter graduation does not want to stay in hospital wants to take oral abx. She plans to follow up with surgery as out patient. She thinks IV abx are helping her and redness receeding. Location :  Rt upper thigh swelling, cellulitis      Quality :  Aching      Severity : 10/10    Duration :  4 weeks     Timing :  Constant   Context  Dog bite :     Modifying factors : none   Associated signs and symptoms: no fever no chills Rt upper thigh redness and swelling         Past Medical History:    History reviewed. No pertinent past medical history. Past Surgical History:    Past Surgical History:   Procedure Laterality Date    HYSTERECTOMY         Current Medications:    No outpatient medications have been marked as taking for the 5/21/22 encounter [facility] Encounter). Allergies:  Patient has no known allergies.     Immunizations :   Immunization History   Administered Date(s) Administered    Tdap (Boostrix, Adacel) 02/19/2019         Social History:   Social History     Tobacco Use    Smoking status: Never Smoker    Smokeless tobacco: Never Used   Substance Use Topics    Alcohol use: Yes     Comment: social    Drug use: Never     Social History     Tobacco Use   Smoking Status Never Smoker   Smokeless Tobacco Never Used      Family History : no DVT no COPD        REVIEW OF SYSTEMS:    Constitutional:  negative for fevers, chills, night sweats  Eyes:  negative for blurred vision, eye discharge, visual disturbance   HEENT:  negative for hearing loss, ear drainage,nasal congestion  Respiratory:  negative for cough, shortness of breath or hemoptysis   Cardiovascular:  negative for chest pain, palpitations, syncope  Gastrointestinal:  negative for nausea, vomiting, diarrhea, constipation, abdominal pain  Genitourinary:  negative for frequency, dysuria, urinary incontinence, hematuria  Hematologic/Lymphatic:  negative for easy bruising, bleeding and lymphadenopathy  Allergic/Immunologic:  negative for recurrent infections, angioedema, anaphylaxis   Endocrine:  negative for weight changes, polyuria, polydipsia and polyphagia  Musculoskeletal:  Rt medial thigh swelling + redness+ cellulitis + , swelling, decreased range of motion  Integumentary: No rashes, skin lesions  Neurological:  negative for headaches, slurred speech, unilateral weakness  Psychiatric: negative for hallucinations,confusion,agitation.      PHYSICAL EXAM:      Vitals:    /68   Pulse 68   Temp 98 °F (36.7 °C) (Oral)   Resp 17   Ht 5' 6\" (1.676 m)   Wt 265 lb 3.4 oz (120.3 kg)   SpO2 96%   BMI 42.81 kg/m²     General Appearance: alert,in no acute distress, no pallor, no icterus   Skin: warm and dry, no rash or erythema  Head: normocephalic and atraumatic  Eyes: pupils equal, round, and reactive to light, conjunctivae normal  ENT: tympanic membrane, external ear and ear canal normal bilaterally, nose without deformity, nasal mucosa and turbinates normal without polyps  Neck: supple and non-tender without mass, no thyromegaly  no cervical lymphadenopathy  Pulmonary/Chest: clear to auscultation bilaterally- no wheezes, rales or rhonchi, normal air movement, no respiratory distress  Cardiovascular: normal rate, regular rhythm, normal S1 and S2, no murmurs, rubs, clicks, or gallops, no carotid bruits  Abdomen: soft, non-tender, non-distended, normal bowel sounds, no masses or organomegaly  Extremities: no cyanosis, clubbing or edema  Musculoskeletal: normal range of motion, no joint swelling, deformity or tenderness  Integumentary: No rashes, no abnormal skin lesions, no petechiae  Neurologic: reflexes normal and symmetric, no cranial nerve deficit  Psych:  Orientation, sensorium, mood normal   Lines: IV  Rt medial thigh redness, local swelling cellulitis and puncture wound with drainage+ induration+     DATA:    CBC:   Lab Results   Component Value Date    WBC 4.7 05/23/2022    HGB 11.8 (L) 05/23/2022    HCT 35.4 (L) 05/23/2022    MCV 87.9 05/23/2022     05/23/2022     RENAL:   Lab Results   Component Value Date    CREATININE 0.6 05/23/2022    BUN 7 05/23/2022     05/23/2022    K 3.8 05/23/2022     05/23/2022    CO2 23 05/23/2022     SED RATE: No results found for: SEDRATE  CK: No results found for: CKTOTAL  CRP: No results found for: CRP  Hepatic Function Panel:   Lab Results   Component Value Date    ALKPHOS 84 05/21/2022    ALT 11 05/21/2022    AST 14 05/21/2022    PROT 7.3 05/21/2022    BILITOT 0.3 05/21/2022    LABALBU 4.6 05/21/2022     UA:No results found for: Gib Songster, GLUCOSEU, BILIRUBINUR, Devonda Breeze, SPECGRAV, BLOODU, PHUR, PROTEINU, UROBILINOGEN, NITRU, LEUKOCYTESUR, LABMICR, URINETYPE   Urine Microscopic: No results found for: LABCAST, BACTERIA, COMU, HYALCAST, WBCUA, RBCUA, EPIU  Urine Reflex to Culture: No results found for: URRFLXCULT      MICRO: cultures reviewed and updated by me    Date/Time      Culture, Wound [[id number]] Collected: 05/21/22 2300   Order Status: Sent Specimen: Abscess Updated: 05/23/22 7032    Gram Stain Result 4+ WBC's (Polymorphonuclear)   No Epithelial Cells seen   2+ Gram positive cocci    Narrative:     ORDER#: [id number]                          ORDERED BY: ALLISON MULLINS   SOURCE: Abscess Thigh Right                COLLECTED:  05/21/22 23:00   ANTIBIOTICS AT REJI. :                      RECEIVED :  05/21/22 23:24   Culture, Blood 2 [[id number]] Collected: 05/21/22 2122   Order Status: Completed Specimen: Blood Updated: 05/23/22 0115    Culture, Blood 2 No Growth to date.  Any change in status will be called. Narrative:     ORDER#: [id number]                          ORDERED BY: ALLISON MULLINS   SOURCE: Blood                              COLLECTED:  05/21/22 21:22   ANTIBIOTICS AT REJI. :                      RECEIVED :  05/21/22 21:28   If child <=2 yrs old please draw pediatric bottle. ~Blood Culture #2   Culture, Blood 1 [[id number]] Collected: 05/21/22 2122   Order Status: Completed Specimen: Blood Updated: 05/23/22 0115    Blood Culture, Routine No Growth to date.  Any change in status will be called. Narrative:     ORDER#: [id number]                          ORDERED BY: ALLISON MULLINS   SOURCE: Blood                              COLLECTED:  05/21/22 21:22   ANTIBIOTICS AT REJI. :                      RECEIVED :  05/21/22 21:28   If child <=2 yrs old please draw pediatric bottle. ~Blood Culture 1         Blood Culture:   Lab Results   Component Value Date    [facility]  05/21/2022     No Growth to date. Any change in status will be called. BLOODCULT2  05/21/2022     No Growth to date. Any change in status will be called. Viral Culture:    No results found for: COVID19  Urine Culture: No results for input(s): LABURIN in the last 72 hours.     Scheduled Meds:   sodium chloride flush  5-40 mL IntraVENous 2 times per day    enoxaparin  30 mg SubCUTAneous BID    ampicillin-sulbactam  3,000 mg IntraVENous Q6H    vancomycin (VANCOCIN) intermittent dosing (placeholder)   Other RX Placeholder    vancomycin  1,250 mg IntraVENous Q12H       Continuous Infusions:   sodium chloride 25 mL (05/23/22 0853)       PRN Meds:  sodium chloride flush, sodium chloride, polyethylene glycol, acetaminophen **OR** acetaminophen, ondansetron, HYDROcodone 5 mg - acetaminophen    Imaging:   No orders to display       All pertinent images and reports for the current Hospitalization were reviewed by me. IMPRESSION:    Patient Active Problem List   Diagnosis    Cellulitis    Dog bite    Abscess     Rt upper thigh abscess  Rt thigh cellulitis  Dog bite  S/p Bed side ID Rt upper thigh in ED  WBC normal   Morbid obesity     Wound cx in process - and she would like to go home for her daughter graduation wants to follow up with surgeon as out pt she is willing to take oral abx        Labs, Microbiology, Radiology and pertinent results from current hospitalization and care every where were reviewed by me as a part of the consultation. PLAN :  1. Cont IV abx as in patient   2. Oral Augmentin x 875 mg x twice a day x 14 days  3. Pt was advised to come back to ED for admit and IV abx if not improving   4. She has apt to see Surgeon as out pt   5. She insists on leaving today due to her daughter graduation     Discussed with patient/Family and Nursing d/w primary team      Thanks for allowing me to participate in your patient's care please call me with any questions or concerns.     Dr. Honey Bland MD  [address] [place] [address] Physician  Phone: [phone]   Fax : [phone]

## 2022-05-23 NOTE — PROGRESS NOTES
PT AAO x4 per this shift. VSS. Pt tolerating Po fluids. Dressing remains in place to right inner thigh . Pt stating that the redness around wound looks to be spreading. Erthema marked at beginning of shift. Observed not to continue to spread per this shift. Pt hopeful to d/c d/t daughters Graduation. Will pass on in shift report. No further needs voiced at this time. Fall precautions in place. Call light within reach. Will continue to monitor.    Electronically signed by Trish Melton RN on 5/23/2022 at 5:55 AM

## 2022-05-23 NOTE — PLAN OF CARE
Problem: Discharge Planning  Goal: Discharge to home or other facility with appropriate resources  5/22/2022 2320 by Patt Duque RN  Outcome: Progressing  Flowsheets (Taken 5/22/2022 2320)  Discharge to home or other facility with appropriate resources:   Identify barriers to discharge with patient and caregiver   Identify discharge learning needs (meds, wound care, etc)   Refer to discharge planning if patient needs post-hospital services based on physician order or complex needs related to functional status, cognitive ability or social support system   Arrange for needed discharge resources and transportation as appropriate  5/22/2022 1504 by Raz Potter RN  Outcome: Not Progressing  Note: Pt will be discharged to appropriate level of care. Plan is to return home. SW and medical team are following. Problem: Pain  Goal: Verbalizes/displays adequate comfort level or baseline comfort level  5/22/2022 2320 by Patt Duque RN  Outcome: Progressing  Flowsheets (Taken 5/22/2022 2320)  Verbalizes/displays adequate comfort level or baseline comfort level:   Encourage patient to monitor pain and request assistance   Administer analgesics based on type and severity of pain and evaluate response   Assess pain using appropriate pain scale   Implement non-pharmacological measures as appropriate and evaluate response  5/22/2022 1504 by Raz Potter RN  Outcome: Not Progressing  Note: Pt assessed for pain. Pt denies any pain at this time. Will continue to monitor pt and assess for pain throughout rest of shift.        Problem: ABCDS Injury Assessment  Goal: Absence of physical injury  Outcome: Progressing  Flowsheets (Taken 5/22/2022 2320)  Absence of Physical Injury: Implement safety measures based on patient assessment

## 2022-05-23 NOTE — DISCHARGE SUMMARY
Hospital Medicine Discharge Summary    Patient ID: Kimberly Morse      Patient's PCP: No primary care provider on file. Admit Date: 5/21/2022     Discharge Date:   05/23/2022    Admitting Provider: Rolo Morgan DO     Discharge Provider: Manuel Royal MD     Discharge Diagnoses: Active Hospital Problems    Diagnosis     Cellulitis [L03.90]      Priority: Medium    Dog bite [O95. 0XXA]      Priority: Medium    Abscess [L02.91]      Priority: Medium       The patient was seen and examined on day of discharge and this discharge summary is in conjunction with any daily progress note from day of discharge. Hospital Course:     From HPI:\"The patient is a 45 y.o. female with no major past medical history who presents to [facility] with worsening right medial thigh swelling and erythema as well as area that seems to be more protuberant than previous that she notes was initially caused by a dog bite from her home dog that she notes was not done intentionally and was just an accident about 2 or 3 weeks ago. She notes that the area did get inflamed and red at the time and did potentially start draining pus at the time but she treated it with home dose of Keflex which she had without a prescription at home. She notes that the area did improve over the course of time for a week or so but then more recently in the last 2 to 3 days she has noticed increasing swelling again and erythema to the area and she is also noticed some areas where it started to become more protuberant of concern for an abscess to her. Otherwise in the last few weeks she has not had any other recent symptoms of fever, chills, dizziness, syncope, chest pain, shortness of breath, dysuria, blood in urine/stool/BM, nausea/vomiting/diarrhea/abdominal pain. \"    1 cellulitis and abscess, abscess drained in emergency department started on Unasyn IV.   General surgery consulted on admission, addED vancomycin, discussed with ID, antibiotics changed to Augmentin and patient will need to follow-up with Dr. Marbella Childs in 2 days. Discussed with general surgery and okay to discharge. 2.  Dog bite as above          Physical Exam Performed:     /69   Pulse 65   Temp 97.6 °F (36.4 °C) (Oral)   Resp 16   Ht 5' 6\" (1.676 m)   Wt 265 lb 3.4 oz (120.3 kg)   SpO2 97%   BMI 42.81 kg/m²       General appearance:  No apparent distress  HEENT:  Normal cephalic  Neck: Supple, with full range of motion. No jugular venous distention. Trachea midline. Respiratory:  Normal respiratory effort. Clear to auscultation, bilaterally without Rales/Wheezes/Rhonchi. Cardiovascular:  Regular rate and rhythm with normal S1/S2 without murmurs, rubs or gallops. Abdomen: Soft, non-tender  Musculoskeletal:  No clubbing, cyanosis mild erythema no obvious oozing from the wound. Skin: AS above  Neurologic:  No focal weakness  Psychiatric:  Alert and oriented  Capillary Refill: Brisk,< 3 seconds   Peripheral Pulses: +2 palpable, equal bilaterally       Labs:  For convenience and continuity at follow-up the following most recent labs are provided:      CBC:    Lab Results   Component Value Date    WBC 4.7 05/23/2022    HGB 11.8 05/23/2022    HCT 35.4 05/23/2022     05/23/2022       Renal:    Lab Results   Component Value Date     05/23/2022    K 3.8 05/23/2022     05/23/2022    CO2 23 05/23/2022    BUN 7 05/23/2022    CREATININE 0.6 05/23/2022    CALCIUM 8.5 05/23/2022         Significant Diagnostic Studies    Radiology:   No orders to display          Consults:     IP CONSULT TO HOSPITALIST  IP CONSULT TO HOSPITALIST  IP CONSULT TO GENERAL SURGERY  PHARMACY TO DOSE VANCOMYCIN  IP CONSULT TO INFECTIOUS DISEASES    Disposition:  home     Condition at Discharge: Stable    Discharge Instructions/Follow-up:  PCPOSCAR    Code Status:  Full Code     Activity: activity as tolerated    Diet: regular diet      Discharge Medications:     Current Discharge Medication List           Details   amoxicillin-clavulanate (AUGMENTIN) 875-125 MG per tablet Take 1 tablet by mouth 2 times daily for 14 days  Qty: 28 tablet, Refills: 0             Time Spent on discharge is more than 45 minutes in the examination, evaluation, counseling and review of medications and discharge plan. Signed:    Luis Fernando Ch MD   5/23/2022      Thank you No primary care provider on file. for the opportunity to be involved in this patient's care. If you have any questions or concerns, please feel free to contact me at [phone].

## 2022-05-23 NOTE — ACP (ADVANCE CARE PLANNING)
Advance Care Planning     Advance Care Planning Activator (Inpatient)  Conversation Note      Date of ACP Conversation: 5/23/2022     Conversation Conducted with: Patient with Decision Making Capacity    ACP Activator: NIKO Amezquita, [facility] Decision Maker:     Current Designated Health Care Decision Maker:     Primary Decision Maker: Alli Mejia - [phone]    Today we documented Decision Maker(s) consistent with Legal Next of Kin hierarchy. Care Preferences    Ventilation: \"If you were in your present state of health and suddenly became very ill and were unable to breathe on your own, what would your preference be about the use of a ventilator (breathing machine) if it were available to you? \"      Would the patient desire the use of ventilator (breathing machine)?: n/a    \"If your health worsens and it becomes clear that your chance of recovery is unlikely, what would your preference be about the use of a ventilator (breathing machine) if it were available to you? \"     Would the patient desire the use of ventilator (breathing machine)?: n/a      Resuscitation  \"CPR works best to restart the heart when there is a sudden event, like a heart attack, in someone who is otherwise healthy. Unfortunately, CPR does not typically restart the heart for people who have serious health conditions or who are very sick. \"    \"In the event your heart stopped as a result of an underlying serious health condition, would you want attempts to be made to restart your heart (answer \"yes\" for attempt to resuscitate) or would you prefer a natural death (answer \"no\" for do not attempt to resuscitate)? \" n/a       [] Yes   [] No   Educated Patient / [facility] regarding differences between Advance Directives and portable DNR orders.     Length of ACP Conversation in minutes:  3    Conversation Outcomes:  [x] ACP discussion completed  [] Existing advance directive reviewed with patient; no changes to patient's previously recorded wishes  [] New Advance Directive completed  [] Portable Do Not Rescitate prepared for Provider review and signature  [] POLST/POST/MOLST/MOST prepared for Provider review and signature      Follow-up plan:    [] Schedule follow-up conversation to continue planning  [] Referred individual to Provider for additional questions/concerns   [] Advised patient/agent/surrogate to review completed ACP document and update if needed with changes in condition, patient preferences or care setting    [] This note routed to one or more involved healthcare providers       Electronically signed by NIKO Gurrola, FAHAD on 5/23/2022 at 9:55 AM

## 2022-05-23 NOTE — PROGRESS NOTES
Pt discharged to home. Ambulated to main entrance with RN where daughter is waiting in the car. Transported in personal vehicle. Discharge instructions and personal belongings given to pt. Rx called into [facility]. Explanation of discharge medications and instructions understood by verbal statement. No questions, comments or concerns at this time.  Electronically signed by Edmundo Gao RN on 5/23/2022 at 1:05 PM

## 2022-05-24 ENCOUNTER — CARE COORDINATION (OUTPATIENT)
Dept: OTHER | Facility: CLINIC | Age: 52
End: 2022-05-24

## 2022-05-24 LAB
GRAM STAIN RESULT: ABNORMAL
ORGANISM: ABNORMAL
ORGANISM: ABNORMAL
WOUND/ABSCESS: ABNORMAL
WOUND/ABSCESS: ABNORMAL

## 2022-05-24 NOTE — CARE COORDINATION
Care Transitions Outreach Attempt    Call within 2 business days of discharge: Yes   Attempted to reach patient for transitions of care follow up. Unable to reach patient. Patient: Felicia Host Patient : 1970 MRN: [id number]    Last Discharge [facility]       Complaint Diagnosis Description Type Department Provider    22 Wound Check Infected dog bite . .. ED to Hosp-Admission (Discharged) (ADMITTED) Carlos Alberto Ceja MD; Sherlyn Marquez ... Was this an external facility discharge? No Discharge Facility: [facility]     Noted following upcoming appointments from discharge chart review:   [facility] follow up appointment(s):   Future Appointments   Date Time Provider Robert Mckinney   2022 10:30 AM Paramjit Wood MD [facility]     Non-[facility] follow up appointment(s): unknown     Needs f/u with PCP, no PCP listed on file.

## 2022-05-25 ENCOUNTER — CARE COORDINATION (OUTPATIENT)
Dept: OTHER | Facility: CLINIC | Age: 52
End: 2022-05-25

## 2022-05-25 NOTE — CARE COORDINATION
Care Transitions Outreach Attempt    Call within 2 business days of discharge: Yes   Attempted to reach patient for transitions of care follow up. Unable to reach patient. Patient: Ramonita Almaraz Patient : 1970 MRN: [id number]    Last Discharge [facility]       Complaint Diagnosis Description Type Department Provider    22 Wound Check Infected dog bite . .. ED to Hosp-Admission (Discharged) (ADMITTED) Jeffrey Armenta MD; Lazarus Morgans ... Was this an external facility discharge?  No Discharge Facility: [address] [place] [address]    Noted following upcoming appointments from discharge chart review:   [facility] follow up appointment(s):   Future Appointments   Date Time Provider Robert Mckinney   2022 10:30 AM Jason Pritchett MD [facility]     Non-[facility] follow up appointment(s): unknown

## 2022-05-26 ENCOUNTER — CARE COORDINATION (OUTPATIENT)
Dept: OTHER | Facility: CLINIC | Age: 52
End: 2022-05-26

## 2022-05-26 ENCOUNTER — OFFICE VISIT (OUTPATIENT)
Dept: SURGERY | Age: 52
End: 2022-05-26

## 2022-05-26 DIAGNOSIS — L02.415 ABSCESS OF RIGHT THIGH: Primary | ICD-10-CM

## 2022-05-26 LAB
BLOOD CULTURE, ROUTINE: NORMAL
CULTURE, BLOOD 2: NORMAL

## 2022-05-26 PROCEDURE — 99999 PR OFFICE/OUTPT VISIT,PROCEDURE ONLY: CPT | Performed by: SURGERY

## 2022-05-26 NOTE — PROGRESS NOTES
Azul Fuentes (:  1970) is a 45 y.o. female,Established patient, here for evaluation of the following chief complaint(s):  Post-Op Check (Pt is here today for a hospital followup. )         ASSESSMENT/PLAN:  1. Abscess of right thigh    Follow up with me as needed           Subjective   SUBJECTIVE/OBJECTIVE:  HPI  Doing much better since discharge. Minimal erythema. Mild edema but no fluctuance. I&d site is clear. Follow up with me as needed    Review of Systems       Objective   Physical Exam       Electronically signed by Herminio Pichardo MD on 2022 at 11:05 AM        An electronic signature was used to authenticate this note.     --Herminio Pichardo MD

## 2022-05-26 NOTE — CARE COORDINATION
[facility] Transitions Follow Up Call    2022    Patient: Ness Loomis  Patient : 1970   MRN: [id number]  Reason for Admission: Abscess of right thigh (dog bite)  Discharge Date: 22 RARS: Readmission Risk Score: 5.2 ( )    ACM received a voicemail from patient this morning, she states she followed up with Dr. Danette Pitt today and \"I am good to go so I don't need surgery or anything so I am fine, I feel much better, wound is healing\". ACM reviewed chart notes from today's visit. ACM attempted to reach patient for Care Transitions call. HIPAA compliant message left requesting a return phone call at patients convenience. Per patients report on voicemail and chart notes, no identified needs for CT or CM at this time. ACM will send a follow up message via [employer] with UTR letter and encouraging patient to reach out should she have needs in the future. No further outreach scheduled with this ACM, ACM will sign off care team at this time. Episode of Care resolved. Patient has this ACM's contact information if future needs arise. Care Transitions Subsequent and Final Call    Schedule Follow Up Appointment with PCP: Completed  Subsequent and Final Calls  Care Transitions Interventions  No Identified Needs  Other Interventions: Follow Up  No future appointments.     Clista Skiff, RN

## 2022-05-30 NOTE — PROGRESS NOTES
Physician Progress Note      PATIENT:               Marlena Cramer  CSN #:                  [id number]  :                       1970  ADMIT DATE:       2022 8:40 PM  [address] DATE:        2022 11:39 AM  RESPONDING  PROVIDER #:        Gelacio PAULA          QUERY TEXT:    Patient admitted with abscess to Rt upper leg  Per procedure note dated    documentation of I&D. To accurately reflect the procedure performed please further specify the depth   of tissue incised and drained: The medical record reflects the following:  Risk Factors: dog iter, abscess rt upper leg  Clinical Indicators: Documentation per procedure in ED of  Incision and   drainage. Leg location:  R upper leg. Procedure details:  Incision types:    Single straight  Scalpel blade:  11  Wound management:  Probed and deloculated   and irrigated with saline. Drainage:  Purulent  Treatment: Incision and drainage, Surgery consult, iv abx    Thank you, Michael Hebert RN CDS CRCR [email or web address]  Options provided:  -- Skin only  -- Subcutaneous tissue  -- Fascia  -- Muscle  -- Other - I will add my own diagnosis  -- Disagree - Not applicable / Not valid  -- Disagree - Clinically unable to determine / Unknown  -- Refer to Clinical Documentation Reviewer    PROVIDER RESPONSE TEXT:    Addendum to  procedure note: This depth of the drainage to Rt upper leg   was skin only. Query created by:  Ruben Hebert on 2022 9:16 AM      Electronically signed by:  Reinier PAULA 2022 4:55 PM

## 2023-01-01 ENCOUNTER — HOSPITAL ENCOUNTER (EMERGENCY)
Age: 53
Discharge: HOME | End: 2023-01-01
Payer: COMMERCIAL

## 2023-01-01 ENCOUNTER — APPOINTMENT (OUTPATIENT)
Dept: CT IMAGING | Age: 53
End: 2023-01-01
Payer: COMMERCIAL

## 2023-01-01 VITALS
RESPIRATION RATE: 16 BRPM | WEIGHT: 261.02 LBS | HEART RATE: 70 BPM | BODY MASS INDEX: 41.95 KG/M2 | DIASTOLIC BLOOD PRESSURE: 89 MMHG | SYSTOLIC BLOOD PRESSURE: 139 MMHG | OXYGEN SATURATION: 98 % | HEIGHT: 66 IN | TEMPERATURE: 97.8 F

## 2023-01-01 DIAGNOSIS — S09.90XA CLOSED HEAD INJURY, INITIAL ENCOUNTER: Primary | ICD-10-CM

## 2023-01-01 DIAGNOSIS — F07.81 POSTCONCUSSIVE SYNDROME: ICD-10-CM

## 2023-01-01 PROCEDURE — 99284 EMERGENCY DEPT VISIT MOD MDM: CPT

## 2023-01-01 PROCEDURE — 70450 CT HEAD/BRAIN W/O DYE: CPT

## 2023-01-01 RX ORDER — IBUPROFEN 200 MG
400 TABLET ORAL EVERY 6 HOURS PRN
Qty: 30 TABLET | Refills: 0 | Status: SHIPPED | OUTPATIENT
Start: 2023-01-01

## 2023-01-01 RX ORDER — ONDANSETRON 4 MG/1
4 TABLET, FILM COATED ORAL EVERY 8 HOURS PRN
Qty: 20 TABLET | Refills: 0 | Status: SHIPPED | OUTPATIENT
Start: 2023-01-01

## 2023-01-01 ASSESSMENT — PAIN DESCRIPTION - FREQUENCY: FREQUENCY: CONTINUOUS

## 2023-01-01 ASSESSMENT — PAIN - FUNCTIONAL ASSESSMENT
PAIN_FUNCTIONAL_ASSESSMENT: 0-10
PAIN_FUNCTIONAL_ASSESSMENT: NONE - DENIES PAIN

## 2023-01-01 ASSESSMENT — PAIN DESCRIPTION - LOCATION: LOCATION: HEAD

## 2023-01-01 ASSESSMENT — LIFESTYLE VARIABLES
HOW OFTEN DO YOU HAVE A DRINK CONTAINING ALCOHOL: 2-3 TIMES A WEEK
HOW MANY STANDARD DRINKS CONTAINING ALCOHOL DO YOU HAVE ON A TYPICAL DAY: 1 OR 2

## 2023-01-01 ASSESSMENT — PAIN DESCRIPTION - DESCRIPTORS: DESCRIPTORS: PRESSURE

## 2023-01-01 ASSESSMENT — PAIN SCALES - GENERAL: PAINLEVEL_OUTOF10: 6

## 2023-01-01 ASSESSMENT — PAIN DESCRIPTION - PAIN TYPE: TYPE: ACUTE PAIN

## 2023-01-01 NOTE — ED PROVIDER NOTES
[address]  [address] [address]  Dept: [phone]  Loc: [address] ENCOUNTER        This patient was not seen or evaluated by the attending physician. I evaluated this patient, the attending physician was available for consultation. CHIEF COMPLAINT    Chief Complaint   Patient presents with    Head Injury     Pt states she hit her head on Friday on her mantle at home. Pt states since hitting her head she has felt foggy, had a headache and nausea. HPI    Aubrey Espino is a 47 y.o. female who presents with a head injury. Onset was 3 days ago. The context was that the patient was bending down to 10 to her dog and she lost her balance and hit the top of her head on the mantle. The patient complains of a headache with severity of 6/10. She complains of associated intermittent nausea. The patient denies associated loss of consciousness. The patient denies vomiting after the injury. The patient denies associated neck pain. No aggravating or alleviating factors. REVIEW OF SYSTEMS    Neurologic: see HPI, No extremity weakness, no LOC  Musculoskeletal: see HPI  Cardiac: No chest pain or chest injury  Respiratory: No difficulty breathing  GI: No abdominal pain or abdominal injury  : No dysuria or hematuria  General: No fever  All other systems reviewed and are negative. PAST MEDICAL & SURGICAL HISTORY    History reviewed. No pertinent past medical history.   Past Surgical History:   Procedure Laterality Date    HYSTERECTOMY (CERVIX STATUS UNKNOWN)         CURRENT MEDICATIONS  (may include discharge medications prescribed in the ED)      ALLERGIES    No Known Allergies    SOCIAL & FAMILY HISTORY    Social History     Socioeconomic History    Marital status:      Spouse name: None    Number of children: None    Years of education: None    Highest education level: None Tobacco Use    Smoking status: Never    Smokeless tobacco: Never   Vaping Use    Vaping Use: Never used   Substance and Sexual Activity    Alcohol use: Yes     Comment: social    Drug use: Never     History reviewed. No pertinent family history. PHYSICAL EXAM    VITAL SIGNS: BP (!) 151/90   Pulse 75   Temp 97.8 °F (36.6 °C) (Oral)   Resp 17   Ht 5' 6\" (1.676 m)   Wt 261 lb 0.4 oz (118.4 kg)   SpO2 98%   BMI 42.13 kg/m²    Constitutional:  Well developed, well nourished, no acute distress   Scalp: no scalp hematoma, no palpable skull fractures  Neck: no posterior midline neck tenderness, no JVD   Eyes: Pupils equally round and react to light, extraocular movement are intact  HENT:  No trismus, airway patent  Respiratory:  Lungs clear to auscultation bilaterally, no retractions   Cardiovascular:  regular rate, no murmurs  GI:  Soft, nontender, normal bowel sounds  Musculoskeletal:  no edema, no acute deformities  Vascular: Radial and DP pulses 2+ and equal bilaterally  Integument:  Well hydrated, no scalp laceration  Neurologic:  Awake, alert, oriented, no aphasia, no slurred speech, CN II-XII intact, normal finger to nose test bilaterally, 5/5 strength in all 4 extremities, sensation to light touch intact bilaterally, biceps reflexes 2+ and equal bilaterally  Psych: Pleasant affect, no hallucinations    RADIOLOGY    CT HEAD WO CONTRAST   Final Result   No acute intracranial abnormality. ED COURSE & MEDICAL DECISION MAKING    Pertinent Labs & Imaging studies reviewed and interpreted. (See chart for details)    Differential Diagnosis: epidural hematoma, subdural hematoma, parenchymal brain contusion or bleed, subarachnoid hemorrhage, skull fracture, neck fracture or dislocation, other. Neuro exam unremarkable. CT findings as above. Patient most likely has postconcussive syndrome, she was given instructions on gradual return to activities.     The patient was instructed to follow up as an outpatient in 2 days. The patient was instructed to return to the ED immediately for any new or worsening symptoms. The patient verbalized understanding. FINAL IMPRESSION    1. Closed head injury, initial encounter    2.  Postconcussive syndrome        PLAN   Discharge with outpatient follow-up (see EMR)    (Please note that this note was completed with a voice recognition program.  Every attempt was made to edit the dictations, but inevitably there remain words that are mis-transcribed.)       Jose Eduardo Sultana, [address]  01/01/23 8339

## 2023-01-03 ENCOUNTER — CARE COORDINATION (OUTPATIENT)
Dept: OTHER | Facility: CLINIC | Age: 53
End: 2023-01-03

## 2023-01-03 NOTE — CARE COORDINATION
[facility] ED Follow Up Call    1/3/2023    Patient: Mick Cockayne Patient : 1970   MRN: [id number]  Reason for Admission: Closed Head Injury  Discharge Date: 23        Care Transitions ED Follow Up    Care Transitions Interventions               ACM attempted to reach patient for introduction to Associate Care Management related to intro to AC, ER f/u. HIPAA compliant message left requesting a return phone call. Will attempt to outreach patient again. Duyen Baron, [facility] Coordinator  Associate Care Management  [address] [place] [address]  Phone: [phone]  [email or web address]

## 2023-01-04 ENCOUNTER — CARE COORDINATION (OUTPATIENT)
Dept: OTHER | Facility: CLINIC | Age: 53
End: 2023-01-04

## 2023-01-04 NOTE — CARE COORDINATION
[address] [facility] ED Follow Up Call    2023    Patient: Jose Gandhi Patient : 1970   MRN: [id number]  Reason for Admission: Closed Head Injury  Discharge Date: 23        Care Transitions ED Follow Up    Care Transitions Interventions               ACM attempted 2nd outreach to reach patient for introduction to Associate Care Management. HIPAA compliant message left requesting a return phone call at patients convenience. Unable to Reach Letter sent to patient via my chart. Will continue to outreach patient. Duyen Garcia, [address] [facility] Coordinator  Associate Care Management  [address] [place] [address]  Phone: [phone]  [email or web address]

## 2024-01-25 ENCOUNTER — APPOINTMENT (OUTPATIENT)
Dept: GENERAL RADIOLOGY | Age: 54
End: 2024-01-25
Payer: COMMERCIAL

## 2024-01-25 ENCOUNTER — HOSPITAL ENCOUNTER (EMERGENCY)
Age: 54
Discharge: HOME | End: 2024-01-25
Attending: STUDENT IN AN ORGANIZED HEALTH CARE EDUCATION/TRAINING PROGRAM
Payer: COMMERCIAL

## 2024-01-25 ENCOUNTER — APPOINTMENT (OUTPATIENT)
Dept: CT IMAGING | Age: 54
End: 2024-01-25
Payer: COMMERCIAL

## 2024-01-25 VITALS
RESPIRATION RATE: 13 BRPM | BODY MASS INDEX: 43.19 KG/M2 | HEIGHT: 66 IN | DIASTOLIC BLOOD PRESSURE: 68 MMHG | HEART RATE: 86 BPM | TEMPERATURE: 98.1 F | OXYGEN SATURATION: 94 % | SYSTOLIC BLOOD PRESSURE: 109 MMHG | WEIGHT: 268.74 LBS

## 2024-01-25 DIAGNOSIS — R07.9 CHEST PAIN, UNSPECIFIED TYPE: ICD-10-CM

## 2024-01-25 DIAGNOSIS — R00.0 TACHYCARDIA: Primary | ICD-10-CM

## 2024-01-25 DIAGNOSIS — R06.02 SHORTNESS OF BREATH: ICD-10-CM

## 2024-01-25 LAB
ALBUMIN SERPL-MCNC: 4.5 G/DL (ref 3.4–5)
ALBUMIN/GLOB SERPL: 2.3 {RATIO} (ref 1.1–2.2)
ALP SERPL-CCNC: 84 U/L (ref 40–129)
ALT SERPL-CCNC: 31 U/L (ref 10–40)
ANION GAP SERPL CALCULATED.3IONS-SCNC: 11 MMOL/L (ref 3–16)
AST SERPL-CCNC: 31 U/L (ref 15–37)
BASE EXCESS BLDV CALC-SCNC: 2.1 MMOL/L
BASOPHILS # BLD: 0.1 K/UL (ref 0–0.2)
BASOPHILS NFR BLD: 0.7 %
BILIRUB SERPL-MCNC: 0.3 MG/DL (ref 0–1)
BUN SERPL-MCNC: 14 MG/DL (ref 7–20)
CALCIUM SERPL-MCNC: 9.1 MG/DL (ref 8.3–10.6)
CHLORIDE SERPL-SCNC: 105 MMOL/L (ref 99–110)
CO2 BLDV-SCNC: 30 MMOL/L
CO2 SERPL-SCNC: 26 MMOL/L (ref 21–32)
COHGB MFR BLDV: 1.2 %
CREAT SERPL-MCNC: 0.8 MG/DL (ref 0.6–1.1)
DEPRECATED RDW RBC AUTO: 14.5 % (ref 12.4–15.4)
EKG ATRIAL RATE: 104 BPM
EKG DIAGNOSIS: NORMAL
EKG P AXIS: 47 DEGREES
EKG P-R INTERVAL: 162 MS
EKG Q-T INTERVAL: 352 MS
EKG QRS DURATION: 94 MS
EKG QTC CALCULATION (BAZETT): 462 MS
EKG R AXIS: -17 DEGREES
EKG T AXIS: 12 DEGREES
EKG VENTRICULAR RATE: 104 BPM
EOSINOPHIL # BLD: 0.1 K/UL (ref 0–0.6)
EOSINOPHIL NFR BLD: 0.8 %
GFR SERPLBLD CREATININE-BSD FMLA CKD-EPI: >60 ML/MIN/{1.73_M2}
GLUCOSE SERPL-MCNC: 100 MG/DL (ref 70–99)
HCO3 BLDV-SCNC: 28 MMOL/L (ref 23–29)
HCT VFR BLD AUTO: 39.9 % (ref 36–48)
HGB BLD-MCNC: 13.2 G/DL (ref 12–16)
LYMPHOCYTES # BLD: 2.3 K/UL (ref 1–5.1)
LYMPHOCYTES NFR BLD: 23.9 %
MCH RBC QN AUTO: 29.1 PG (ref 26–34)
MCHC RBC AUTO-ENTMCNC: 33.2 G/DL (ref 31–36)
MCV RBC AUTO: 87.4 FL (ref 80–100)
METHGB MFR BLDV: 0.6 %
MONOCYTES # BLD: 0.7 K/UL (ref 0–1.3)
MONOCYTES NFR BLD: 7.2 %
NEUTROPHILS # BLD: 6.5 K/UL (ref 1.7–7.7)
NEUTROPHILS NFR BLD: 67.4 %
NT-PROBNP SERPL-MCNC: 1017 PG/ML (ref 0–124)
O2 THERAPY: NORMAL
PCO2 BLDV: 48.8 MMHG (ref 40–50)
PH BLDV: 7.37 [PH] (ref 7.35–7.45)
PLATELET # BLD AUTO: 270 K/UL (ref 135–450)
PMV BLD AUTO: 7.9 FL (ref 5–10.5)
PO2 BLDV: <30 MMHG
POTASSIUM SERPL-SCNC: 4.1 MMOL/L (ref 3.5–5.1)
PROT SERPL-MCNC: 6.5 G/DL (ref 6.4–8.2)
RBC # BLD AUTO: 4.56 M/UL (ref 4–5.2)
SAO2 % BLDV: 44 %
SODIUM SERPL-SCNC: 142 MMOL/L (ref 136–145)
TROPONIN, HIGH SENSITIVITY: 10 NG/L (ref 0–14)
TROPONIN, HIGH SENSITIVITY: 8 NG/L (ref 0–14)
TSH SERPL DL<=0.005 MIU/L-ACNC: 1.42 UIU/ML (ref 0.27–4.2)
WBC # BLD AUTO: 9.7 K/UL (ref 4–11)

## 2024-01-25 PROCEDURE — 6360000002 HC RX W HCPCS: Performed by: STUDENT IN AN ORGANIZED HEALTH CARE EDUCATION/TRAINING PROGRAM

## 2024-01-25 PROCEDURE — 71045 X-RAY EXAM CHEST 1 VIEW: CPT

## 2024-01-25 PROCEDURE — 99285 EMERGENCY DEPT VISIT HI MDM: CPT

## 2024-01-25 PROCEDURE — 71260 CT THORAX DX C+: CPT

## 2024-01-25 PROCEDURE — 93005 ELECTROCARDIOGRAM TRACING: CPT | Performed by: STUDENT IN AN ORGANIZED HEALTH CARE EDUCATION/TRAINING PROGRAM

## 2024-01-25 PROCEDURE — 93010 ELECTROCARDIOGRAM REPORT: CPT | Performed by: INTERNAL MEDICINE

## 2024-01-25 PROCEDURE — 96374 THER/PROPH/DIAG INJ IV PUSH: CPT

## 2024-01-25 PROCEDURE — 36415 COLL VENOUS BLD VENIPUNCTURE: CPT

## 2024-01-25 PROCEDURE — 84443 ASSAY THYROID STIM HORMONE: CPT

## 2024-01-25 PROCEDURE — 82803 BLOOD GASES ANY COMBINATION: CPT

## 2024-01-25 PROCEDURE — 84484 ASSAY OF TROPONIN QUANT: CPT

## 2024-01-25 PROCEDURE — 85025 COMPLETE CBC W/AUTO DIFF WBC: CPT

## 2024-01-25 PROCEDURE — 83880 ASSAY OF NATRIURETIC PEPTIDE: CPT

## 2024-01-25 PROCEDURE — 6360000004 HC RX CONTRAST MEDICATION: Performed by: STUDENT IN AN ORGANIZED HEALTH CARE EDUCATION/TRAINING PROGRAM

## 2024-01-25 PROCEDURE — 6370000000 HC RX 637 (ALT 250 FOR IP): Performed by: STUDENT IN AN ORGANIZED HEALTH CARE EDUCATION/TRAINING PROGRAM

## 2024-01-25 PROCEDURE — 80053 COMPREHEN METABOLIC PANEL: CPT

## 2024-01-25 RX ORDER — METHOCARBAMOL 500 MG/1
1000 TABLET, FILM COATED ORAL ONCE
Status: COMPLETED | OUTPATIENT
Start: 2024-01-25 | End: 2024-01-25

## 2024-01-25 RX ORDER — KETOROLAC TROMETHAMINE 15 MG/ML
15 INJECTION, SOLUTION INTRAMUSCULAR; INTRAVENOUS ONCE
Status: COMPLETED | OUTPATIENT
Start: 2024-01-25 | End: 2024-01-25

## 2024-01-25 RX ADMIN — IOPAMIDOL 75 ML: 755 INJECTION, SOLUTION INTRAVENOUS at 16:22

## 2024-01-25 RX ADMIN — KETOROLAC TROMETHAMINE 15 MG: 15 INJECTION, SOLUTION INTRAMUSCULAR; INTRAVENOUS at 21:33

## 2024-01-25 RX ADMIN — NITROGLYCERIN 0.5 INCH: 20 OINTMENT TOPICAL at 22:12

## 2024-01-25 RX ADMIN — METHOCARBAMOL 1000 MG: 500 TABLET ORAL at 18:54

## 2024-01-25 RX ADMIN — Medication: at 18:55

## 2024-01-25 ASSESSMENT — PAIN SCALES - GENERAL
PAINLEVEL_OUTOF10: 5
PAINLEVEL_OUTOF10: 6
PAINLEVEL_OUTOF10: 4

## 2024-01-25 ASSESSMENT — PAIN - FUNCTIONAL ASSESSMENT: PAIN_FUNCTIONAL_ASSESSMENT: NONE - DENIES PAIN

## 2024-01-25 ASSESSMENT — LIFESTYLE VARIABLES
HOW OFTEN DO YOU HAVE A DRINK CONTAINING ALCOHOL: NEVER
HOW MANY STANDARD DRINKS CONTAINING ALCOHOL DO YOU HAVE ON A TYPICAL DAY: PATIENT DOES NOT DRINK

## 2024-01-25 ASSESSMENT — PAIN DESCRIPTION - LOCATION: LOCATION: CHEST

## 2024-01-25 ASSESSMENT — PAIN DESCRIPTION - DESCRIPTORS: DESCRIPTORS: PRESSURE

## 2024-01-25 NOTE — ED TRIAGE NOTES
Pt into ER from home with c/c Irregular heart rate, off and on for 10 days with SOB that is worsening.  No Hx of A-fib.  Pt denies CP, nausea.

## 2024-01-25 NOTE — ED PROVIDER NOTES
[facility] EMERGENCY DEPARTMENT    CHIEF COMPLAINT  Irregular Heart Beat (Irregular heart rate, off and on for 10 days with SOB that is worsening.  No Hx of A-fib.  Pt denies CP, nausea.  )       HISTORY OF PRESENT ILLNESS  Sherron Wright is a 53 y.o. female presenting to the ED for shortness of breath.  Patient states she has had intermittent shortness of breath for a long time however it is severely worsened over the last week.  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  Patient does also report slight chest pressure in her substernal region.  Patient states that it is not pain just light pressure.  Patient denies having any cardiac history or pulmonary history.  Patient states she takes no medications.  Patient denies all past medical history.  Patient denies fever.  Occasionally she states she has a dry cough due to allergies.  Patient also reports fatigue.  Patient denies fever, abdominal pain, nausea, vomiting, loose stools.    - History obtained from: Patient  - Limitations to history: None    I have reviewed the following from the nursing documentation:    History reviewed. No pertinent past medical history.  Past Surgical History:   Procedure Laterality Date    HYSTERECTOMY (CERVIX STATUS UNKNOWN)       History reviewed. No pertinent family history.  Social History     Socioeconomic History    Marital status:      Spouse name: Not on file    Number of children: Not on file    Years of education: Not on file    Highest education level: Not on file   Occupational History    Not on file   Tobacco Use    Smoking status: Never    Smokeless tobacco: Never   Vaping Use    Vaping Use: Never used   Substance and Sexual Activity    Alcohol use: Yes     Comment: social    Drug use: Never    Sexual activity: Not on file   Other Topics Concern    Not on file   Social History Narrative    Not on file     Social Determinants

## 2024-01-25 NOTE — DISCHARGE INSTRUCTIONS
Patient is hemodynamically stable.  Please follow with your primary care doctor.  Please, to the emergency department for develop chest pain or shortness of breath.

## 2024-01-26 ENCOUNTER — CARE COORDINATION (OUTPATIENT)
Dept: OTHER | Facility: CLINIC | Age: 54
End: 2024-01-26

## 2024-01-26 LAB
EKG ATRIAL RATE: 84 BPM
EKG DIAGNOSIS: NORMAL
EKG P AXIS: 47 DEGREES
EKG P-R INTERVAL: 168 MS
EKG Q-T INTERVAL: 402 MS
EKG QRS DURATION: 90 MS
EKG QTC CALCULATION (BAZETT): 475 MS
EKG R AXIS: -14 DEGREES
EKG T AXIS: 3 DEGREES
EKG VENTRICULAR RATE: 84 BPM

## 2024-01-26 PROCEDURE — 93010 ELECTROCARDIOGRAM REPORT: CPT | Performed by: INTERNAL MEDICINE

## 2024-01-26 NOTE — CARE COORDINATION
ED Follow Up Call    2024    Patient: Sherron Wright Patient : 1970   MRN: [id number]  Reason for Admission: Tachycardia  Discharge Date: 24        Care Transitions ED Follow Up    Care Transitions Interventions             Ambulatory Care Manager (ACM) attempted to contact the patient by telephone to perform post ED visit assessment.  Left HIPPA compliant message providing introduction to self and requested a call back.  Will continue to outreach to patient.   Will send [employer] message in the meantime.    Follow up:  No future appointments.

## 2024-01-29 ENCOUNTER — CARE COORDINATION (OUTPATIENT)
Dept: OTHER | Facility: CLINIC | Age: 54
End: 2024-01-29

## 2024-01-29 NOTE — CARE COORDINATION
ED Follow Up Call    2024    Patient: Sherron Wright Patient : 1970   MRN: [id number]  Reason for Admission: Tachycardia  Discharge Date: 24        Care Transitions ED Follow Up    Care Transitions Interventions             Ambulatory Care Manager (ACM) attempted to contact the patient by telephone to perform post ED visit assessment. Not able to leave a message.  Will continue to outreach to patient. Will send [employer] message in the meantime.

## 2024-01-30 NOTE — PROGRESS NOTES
[facility]  Cardiology Note      Sherron Wright  1970, 53 y.o.        CC:  Chest pain             Semaj Rivas MD:      HPI:   This is a 53 y.o. female who presented to the ER for irregular heart rate, fatigue and chest pressure, she also is stating shortness of breath that has been increasing over the last week.    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  She has noticed shortness of breath that is getting progressively worse.  She has a formal and has to attend to the animals and while doing heavy physical work she feels more shortness of breath than usual.  She also describes a pressure in her chest which is present constantly and not necessarily made worse with exertion..  She is not a diabetic does not smoke but her father had open heart surgery in her 40s.  She also describes palpitations and she claims that have phone tells her that she may have A-fib.    In the emergency room she was found to have a proBNP of greater than the thousand.      No past medical history on file.   Past Surgical History:   Procedure Laterality Date    HYSTERECTOMY (CERVIX STATUS UNKNOWN)        No family history on file.   Social History     Tobacco Use    Smoking status: Never    Smokeless tobacco: Never   Vaping Use    Vaping Use: Never used   Substance Use Topics    Alcohol use: Yes     Comment: social    Drug use: Never     No Known Allergies      Review of Systems -   Constitutional: Negative for weight gain/loss; malaise, fever  Respiratory: Negative for Asthma;  cough and hemoptysis  Cardiovascular: Negative for palpitations,dizziness   Gastrointestinal: Negative for abd.pain; constipation/diarrhea;    Genitourinary: Negative for stones; hematuria; frequency hesitancy  Integumentt: Negative for rash or pruritis  Hematologic/lymphatic: Negative for blood dyscrasia; leukemia/lymphoma  Musculoskeletal: Negative for Connective tissue disease  Neurological:  Negative for Seizure

## 2024-02-01 ENCOUNTER — OFFICE VISIT (OUTPATIENT)
Dept: CARDIOLOGY CLINIC | Age: 54
End: 2024-02-01
Payer: COMMERCIAL

## 2024-02-01 VITALS
DIASTOLIC BLOOD PRESSURE: 66 MMHG | SYSTOLIC BLOOD PRESSURE: 114 MMHG | OXYGEN SATURATION: 98 % | BODY MASS INDEX: 43.23 KG/M2 | HEART RATE: 72 BPM | HEIGHT: 66 IN | WEIGHT: 269 LBS

## 2024-02-01 DIAGNOSIS — R07.9 CHEST PAIN IN ADULT: Primary | ICD-10-CM

## 2024-02-01 PROCEDURE — 99204 OFFICE O/P NEW MOD 45 MIN: CPT | Performed by: INTERNAL MEDICINE

## 2024-02-01 PROCEDURE — 93000 ELECTROCARDIOGRAM COMPLETE: CPT | Performed by: INTERNAL MEDICINE

## 2024-02-01 RX ORDER — TORSEMIDE 10 MG/1
10 TABLET ORAL DAILY
Qty: 30 TABLET | Refills: 3 | Status: SHIPPED | OUTPATIENT
Start: 2024-02-01

## 2024-02-01 RX ORDER — DOBUTAMINE HYDROCHLORIDE 200 MG/100ML
10 INJECTION INTRAVENOUS CONTINUOUS
Status: CANCELLED | OUTPATIENT
Start: 2024-02-01

## 2024-02-01 RX ORDER — PROCHLORPERAZINE MALEATE 5 MG/1
5 TABLET ORAL PRN
COMMUNITY
Start: 2023-11-24

## 2024-02-01 RX ORDER — OMEPRAZOLE 20 MG/1
20 CAPSULE, DELAYED RELEASE ORAL DAILY
COMMUNITY

## 2024-02-01 RX ORDER — SPIRONOLACTONE 25 MG/1
25 TABLET ORAL DAILY
Qty: 30 TABLET | Refills: 11 | Status: SHIPPED | OUTPATIENT
Start: 2024-02-01 | End: 2025-01-31

## 2024-02-01 RX ORDER — LISINOPRIL 20 MG/1
10 TABLET ORAL DAILY
Qty: 30 TABLET | Refills: 3 | Status: SHIPPED | OUTPATIENT
Start: 2024-02-01

## 2024-02-01 RX ORDER — VITAMIN B COMPLEX
1 CAPSULE ORAL DAILY
COMMUNITY

## 2024-02-01 NOTE — PATIENT INSTRUCTIONS
Take torsemide 10 mg daily for 3 days than 3 days week.  Spironolactone 25 mg take every other day.  Lisinopril 10 mg nightly.

## 2024-02-02 ENCOUNTER — CARE COORDINATION (OUTPATIENT)
Dept: OTHER | Facility: CLINIC | Age: 54
End: 2024-02-02

## 2024-02-02 NOTE — CARE COORDINATION
ED Follow Up Call    2024    Patient: Sherron Wright Patient : 1970   MRN: [id number]  Reason for Admission: Tachycardia  Discharge Date: 24        Care Transitions ED Follow Up    Care Transitions Interventions             Ambulatory Care Manager (ACM) attempted to contact the patient again by telephone to perform post ED visit assessment. Left HIPPA compliant message providing introduction to self and requested a call back. Patient has not responded to several attempts to contact.  ACM will send [facility] contact [facility] message/ letter and sign off if no response.     Follow up:  Future Appointments   Date Time Provider Department Center   2024  8:30 AM SCHEDULE, [facility] RM 2 [facility]   2024  2:20 PM Dallin Arvizu MD [facility]

## 2024-02-06 ENCOUNTER — HOSPITAL ENCOUNTER (OUTPATIENT)
Dept: NON INVASIVE DIAGNOSTICS | Age: 54
Discharge: HOME | End: 2024-02-06

## 2024-02-06 DIAGNOSIS — R79.89 ELEVATED BRAIN NATRIURETIC PEPTIDE (BNP) LEVEL: ICD-10-CM

## 2024-02-06 DIAGNOSIS — R06.02 SHORTNESS OF BREATH: ICD-10-CM

## 2024-02-06 PROCEDURE — 6360000004 HC RX CONTRAST MEDICATION: Performed by: INTERNAL MEDICINE

## 2024-02-06 PROCEDURE — C8929 TTE W OR WO FOL WCON,DOPPLER: HCPCS

## 2024-02-06 RX ADMIN — PERFLUTREN 1.5 ML: 6.52 INJECTION, SUSPENSION INTRAVENOUS at 09:30

## 2024-02-06 NOTE — PROGRESS NOTES
[facility]  Cardiology Note      Sherron Wright  1970, 53 y.o.        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  She also describes a pressure in her chest which is present constantly and not necessarily made worse with exertion..  She is not a diabetic does not smoke but her father had open heart surgery in his 40s.  She also describes palpitations and she claims that her iPhone shows A-fib    In the emergency room she was found to have a proBNP of greater than a  thousand.  On her last visit I felt that the proBNP was reflective of diastolic heart failure.  An echocardiogram done recently shows that the heart function is good with no major valvular abnormalities.  She was treated with torsemide and Aldactone and seems to be feeling better.     She got the [employer] mobile device and I looked at the 4 episodes that she recorded and they all appear to be sinus with no atrial fibrillation.  We will continue to monitor her for that.    She is also expresses concern that \"I do not want to drop that I feel something is wrong.  She mentioned later to me that with physical exertion she sometimes would get pressure in her chest that would go into her neck and both arms.  She did not mention this to the first time.      No past medical history on file.   Past Surgical History:   Procedure Laterality Date    HYSTERECTOMY (CERVIX STATUS UNKNOWN)        No family history on file.   Social History     Tobacco Use    Smoking status: Never    Smokeless tobacco: Never   Vaping Use    Vaping Use: Never used   Substance Use Topics    Alcohol use: Yes     Comment: social    Drug

## 2024-02-07 ENCOUNTER — OFFICE VISIT (OUTPATIENT)
Dept: CARDIOLOGY CLINIC | Age: 54
End: 2024-02-07

## 2024-02-07 ENCOUNTER — TELEPHONE (OUTPATIENT)
Dept: CARDIOLOGY CLINIC | Age: 54
End: 2024-02-07

## 2024-02-07 VITALS
SYSTOLIC BLOOD PRESSURE: 118 MMHG | WEIGHT: 265.2 LBS | OXYGEN SATURATION: 97 % | HEIGHT: 66 IN | DIASTOLIC BLOOD PRESSURE: 78 MMHG | HEART RATE: 77 BPM | BODY MASS INDEX: 42.62 KG/M2

## 2024-02-07 DIAGNOSIS — E66.01 OBESITY, CLASS III, BMI 40-49.9 (MORBID OBESITY) (HCC): ICD-10-CM

## 2024-02-07 DIAGNOSIS — R07.89 OTHER CHEST PAIN: Primary | ICD-10-CM

## 2024-02-07 DIAGNOSIS — R07.9 CHEST PAIN IN ADULT: Primary | ICD-10-CM

## 2024-02-07 PROCEDURE — 99214 OFFICE O/P EST MOD 30 MIN: CPT | Performed by: INTERNAL MEDICINE

## 2024-02-07 NOTE — TELEPHONE ENCOUNTER
Carmen from [employer] called in regards to Sherron's cardiac stress test - exercise only. She is unable to schedule this b/c order class is Clinic performed and it should be hospital.    Please assist on change of order, we can reach out to Sherron so she can schedule this once updated.

## 2024-02-09 ENCOUNTER — HOSPITAL ENCOUNTER (OUTPATIENT)
Age: 54
Setting detail: OBSERVATION
Discharge: HOME | End: 2024-02-10
Attending: EMERGENCY MEDICINE | Admitting: INTERNAL MEDICINE
Payer: COMMERCIAL

## 2024-02-09 ENCOUNTER — TELEPHONE (OUTPATIENT)
Dept: CARDIOLOGY CLINIC | Age: 54
End: 2024-02-09

## 2024-02-09 ENCOUNTER — APPOINTMENT (OUTPATIENT)
Dept: GENERAL RADIOLOGY | Age: 54
End: 2024-02-09

## 2024-02-09 DIAGNOSIS — R06.00 DYSPNEA, UNSPECIFIED TYPE: ICD-10-CM

## 2024-02-09 DIAGNOSIS — R07.9 CHEST PAIN, UNSPECIFIED TYPE: Primary | ICD-10-CM

## 2024-02-09 PROBLEM — I10 PRIMARY HYPERTENSION: Status: RESOLVED | Noted: 2024-02-09 | Resolved: 2024-02-09

## 2024-02-09 PROBLEM — I10 PRIMARY HYPERTENSION: Status: ACTIVE | Noted: 2024-02-09

## 2024-02-09 LAB
ALBUMIN SERPL-MCNC: 4.5 G/DL (ref 3.4–5)
ALBUMIN/GLOB SERPL: 1.6 {RATIO} (ref 1.1–2.2)
ALP SERPL-CCNC: 84 U/L (ref 40–129)
ALT SERPL-CCNC: 12 U/L (ref 10–40)
ANION GAP SERPL CALCULATED.3IONS-SCNC: 12 MMOL/L (ref 3–16)
AST SERPL-CCNC: 18 U/L (ref 15–37)
BASOPHILS # BLD: 0 K/UL (ref 0–0.2)
BASOPHILS NFR BLD: 0.3 %
BILIRUB SERPL-MCNC: <0.2 MG/DL (ref 0–1)
BUN SERPL-MCNC: 18 MG/DL (ref 7–20)
CALCIUM SERPL-MCNC: 9.6 MG/DL (ref 8.3–10.6)
CHLORIDE SERPL-SCNC: 97 MMOL/L (ref 99–110)
CO2 SERPL-SCNC: 27 MMOL/L (ref 21–32)
CREAT SERPL-MCNC: 0.8 MG/DL (ref 0.6–1.1)
D DIMER: <0.27 UG/ML FEU (ref 0–0.6)
DEPRECATED RDW RBC AUTO: 14 % (ref 12.4–15.4)
EOSINOPHIL # BLD: 0.1 K/UL (ref 0–0.6)
EOSINOPHIL NFR BLD: 1.2 %
GFR SERPLBLD CREATININE-BSD FMLA CKD-EPI: >60 ML/MIN/{1.73_M2}
GLUCOSE SERPL-MCNC: 82 MG/DL (ref 70–99)
HCT VFR BLD AUTO: 40.3 % (ref 36–48)
HGB BLD-MCNC: 14 G/DL (ref 12–16)
LYMPHOCYTES # BLD: 2 K/UL (ref 1–5.1)
LYMPHOCYTES NFR BLD: 21.8 %
MCH RBC QN AUTO: 30 PG (ref 26–34)
MCHC RBC AUTO-ENTMCNC: 34.8 G/DL (ref 31–36)
MCV RBC AUTO: 86.3 FL (ref 80–100)
MONOCYTES # BLD: 0.8 K/UL (ref 0–1.3)
MONOCYTES NFR BLD: 8.3 %
NEUTROPHILS # BLD: 6.4 K/UL (ref 1.7–7.7)
NEUTROPHILS NFR BLD: 68.4 %
NT-PROBNP SERPL-MCNC: <36 PG/ML (ref 0–124)
PLATELET # BLD AUTO: 243 K/UL (ref 135–450)
PMV BLD AUTO: 8 FL (ref 5–10.5)
POTASSIUM SERPL-SCNC: 3.8 MMOL/L (ref 3.5–5.1)
PROT SERPL-MCNC: 7.3 G/DL (ref 6.4–8.2)
RBC # BLD AUTO: 4.67 M/UL (ref 4–5.2)
SODIUM SERPL-SCNC: 136 MMOL/L (ref 136–145)
TROPONIN, HIGH SENSITIVITY: 7 NG/L (ref 0–14)
TROPONIN, HIGH SENSITIVITY: 8 NG/L (ref 0–14)
WBC # BLD AUTO: 9.3 K/UL (ref 4–11)

## 2024-02-09 PROCEDURE — 6370000000 HC RX 637 (ALT 250 FOR IP): Performed by: INTERNAL MEDICINE

## 2024-02-09 PROCEDURE — 83880 ASSAY OF NATRIURETIC PEPTIDE: CPT

## 2024-02-09 PROCEDURE — 99285 EMERGENCY DEPT VISIT HI MDM: CPT

## 2024-02-09 PROCEDURE — 85379 FIBRIN DEGRADATION QUANT: CPT

## 2024-02-09 PROCEDURE — G0378 HOSPITAL OBSERVATION PER HR: HCPCS

## 2024-02-09 PROCEDURE — 85025 COMPLETE CBC W/AUTO DIFF WBC: CPT

## 2024-02-09 PROCEDURE — 6370000000 HC RX 637 (ALT 250 FOR IP): Performed by: EMERGENCY MEDICINE

## 2024-02-09 PROCEDURE — 93005 ELECTROCARDIOGRAM TRACING: CPT | Performed by: INTERNAL MEDICINE

## 2024-02-09 PROCEDURE — 80053 COMPREHEN METABOLIC PANEL: CPT

## 2024-02-09 PROCEDURE — 71046 X-RAY EXAM CHEST 2 VIEWS: CPT

## 2024-02-09 PROCEDURE — 84484 ASSAY OF TROPONIN QUANT: CPT

## 2024-02-09 PROCEDURE — 36415 COLL VENOUS BLD VENIPUNCTURE: CPT

## 2024-02-09 PROCEDURE — 2580000003 HC RX 258: Performed by: INTERNAL MEDICINE

## 2024-02-09 PROCEDURE — 93005 ELECTROCARDIOGRAM TRACING: CPT | Performed by: EMERGENCY MEDICINE

## 2024-02-09 RX ORDER — ATORVASTATIN CALCIUM 40 MG/1
40 TABLET, FILM COATED ORAL NIGHTLY
Status: DISCONTINUED | OUTPATIENT
Start: 2024-02-09 | End: 2024-02-10 | Stop reason: HOSPADM

## 2024-02-09 RX ORDER — NITROGLYCERIN 0.4 MG/1
0.4 TABLET SUBLINGUAL EVERY 5 MIN PRN
Status: DISCONTINUED | OUTPATIENT
Start: 2024-02-09 | End: 2024-02-10 | Stop reason: HOSPADM

## 2024-02-09 RX ORDER — ACETAMINOPHEN 650 MG/1
650 SUPPOSITORY RECTAL EVERY 6 HOURS PRN
Status: DISCONTINUED | OUTPATIENT
Start: 2024-02-09 | End: 2024-02-10 | Stop reason: HOSPADM

## 2024-02-09 RX ORDER — NITROGLYCERIN 0.4 MG/1
0.4 TABLET SUBLINGUAL ONCE
Status: COMPLETED | OUTPATIENT
Start: 2024-02-09 | End: 2024-02-09

## 2024-02-09 RX ORDER — MAGNESIUM SULFATE IN WATER 40 MG/ML
2000 INJECTION, SOLUTION INTRAVENOUS PRN
Status: DISCONTINUED | OUTPATIENT
Start: 2024-02-09 | End: 2024-02-10 | Stop reason: HOSPADM

## 2024-02-09 RX ORDER — ONDANSETRON 4 MG/1
4 TABLET, ORALLY DISINTEGRATING ORAL EVERY 8 HOURS PRN
Status: DISCONTINUED | OUTPATIENT
Start: 2024-02-09 | End: 2024-02-10 | Stop reason: HOSPADM

## 2024-02-09 RX ORDER — SODIUM CHLORIDE 9 MG/ML
INJECTION, SOLUTION INTRAVENOUS PRN
Status: DISCONTINUED | OUTPATIENT
Start: 2024-02-09 | End: 2024-02-10 | Stop reason: HOSPADM

## 2024-02-09 RX ORDER — CARVEDILOL 3.12 MG/1
3.12 TABLET ORAL 2 TIMES DAILY WITH MEALS
Status: DISCONTINUED | OUTPATIENT
Start: 2024-02-09 | End: 2024-02-10

## 2024-02-09 RX ORDER — MAGNESIUM HYDROXIDE/ALUMINUM HYDROXICE/SIMETHICONE 120; 1200; 1200 MG/30ML; MG/30ML; MG/30ML
30 SUSPENSION ORAL EVERY 6 HOURS PRN
Status: DISCONTINUED | OUTPATIENT
Start: 2024-02-09 | End: 2024-02-10 | Stop reason: HOSPADM

## 2024-02-09 RX ORDER — POTASSIUM CHLORIDE 20 MEQ/1
40 TABLET, EXTENDED RELEASE ORAL PRN
Status: DISCONTINUED | OUTPATIENT
Start: 2024-02-09 | End: 2024-02-10 | Stop reason: HOSPADM

## 2024-02-09 RX ORDER — ASPIRIN 81 MG/1
324 TABLET, CHEWABLE ORAL ONCE
Status: COMPLETED | OUTPATIENT
Start: 2024-02-09 | End: 2024-02-09

## 2024-02-09 RX ORDER — ACETAMINOPHEN 325 MG/1
650 TABLET ORAL EVERY 6 HOURS PRN
Status: DISCONTINUED | OUTPATIENT
Start: 2024-02-09 | End: 2024-02-10 | Stop reason: HOSPADM

## 2024-02-09 RX ORDER — SPIRONOLACTONE 25 MG/1
25 TABLET ORAL DAILY
Status: DISCONTINUED | OUTPATIENT
Start: 2024-02-10 | End: 2024-02-10 | Stop reason: HOSPADM

## 2024-02-09 RX ORDER — SODIUM CHLORIDE 0.9 % (FLUSH) 0.9 %
5-40 SYRINGE (ML) INJECTION PRN
Status: DISCONTINUED | OUTPATIENT
Start: 2024-02-09 | End: 2024-02-10 | Stop reason: HOSPADM

## 2024-02-09 RX ORDER — ASPIRIN 81 MG/1
81 TABLET, CHEWABLE ORAL DAILY
Status: DISCONTINUED | OUTPATIENT
Start: 2024-02-10 | End: 2024-02-10 | Stop reason: HOSPADM

## 2024-02-09 RX ORDER — TORSEMIDE 20 MG/1
10 TABLET ORAL DAILY
Status: DISCONTINUED | OUTPATIENT
Start: 2024-02-10 | End: 2024-02-10 | Stop reason: HOSPADM

## 2024-02-09 RX ORDER — ENOXAPARIN SODIUM 100 MG/ML
30 INJECTION SUBCUTANEOUS 2 TIMES DAILY
Status: DISCONTINUED | OUTPATIENT
Start: 2024-02-09 | End: 2024-02-10 | Stop reason: HOSPADM

## 2024-02-09 RX ORDER — SODIUM CHLORIDE 0.9 % (FLUSH) 0.9 %
5-40 SYRINGE (ML) INJECTION EVERY 12 HOURS SCHEDULED
Status: DISCONTINUED | OUTPATIENT
Start: 2024-02-09 | End: 2024-02-10 | Stop reason: HOSPADM

## 2024-02-09 RX ORDER — POLYETHYLENE GLYCOL 3350 17 G/17G
17 POWDER, FOR SOLUTION ORAL DAILY PRN
Status: DISCONTINUED | OUTPATIENT
Start: 2024-02-09 | End: 2024-02-10 | Stop reason: HOSPADM

## 2024-02-09 RX ORDER — POTASSIUM CHLORIDE 7.45 MG/ML
10 INJECTION INTRAVENOUS PRN
Status: DISCONTINUED | OUTPATIENT
Start: 2024-02-09 | End: 2024-02-10 | Stop reason: HOSPADM

## 2024-02-09 RX ORDER — ONDANSETRON 2 MG/ML
4 INJECTION INTRAMUSCULAR; INTRAVENOUS EVERY 6 HOURS PRN
Status: DISCONTINUED | OUTPATIENT
Start: 2024-02-09 | End: 2024-02-10 | Stop reason: HOSPADM

## 2024-02-09 RX ORDER — PANTOPRAZOLE SODIUM 40 MG/1
40 TABLET, DELAYED RELEASE ORAL
Status: DISCONTINUED | OUTPATIENT
Start: 2024-02-10 | End: 2024-02-10 | Stop reason: HOSPADM

## 2024-02-09 RX ORDER — LISINOPRIL 10 MG/1
10 TABLET ORAL DAILY
Status: DISCONTINUED | OUTPATIENT
Start: 2024-02-09 | End: 2024-02-10 | Stop reason: HOSPADM

## 2024-02-09 RX ADMIN — ASPIRIN 324 MG: 81 TABLET, CHEWABLE ORAL at 17:41

## 2024-02-09 RX ADMIN — ATORVASTATIN CALCIUM 40 MG: 40 TABLET, FILM COATED ORAL at 21:28

## 2024-02-09 RX ADMIN — SODIUM CHLORIDE, PRESERVATIVE FREE 10 ML: 5 INJECTION INTRAVENOUS at 21:29

## 2024-02-09 RX ADMIN — NITROGLYCERIN 0.4 MG: 0.4 TABLET, ORALLY DISINTEGRATING SUBLINGUAL at 17:46

## 2024-02-09 ASSESSMENT — PAIN DESCRIPTION - DESCRIPTORS: DESCRIPTORS: ACHING

## 2024-02-09 ASSESSMENT — PAIN SCALES - GENERAL
PAINLEVEL_OUTOF10: 3
PAINLEVEL_OUTOF10: 0

## 2024-02-09 ASSESSMENT — PAIN DESCRIPTION - LOCATION: LOCATION: NECK

## 2024-02-09 NOTE — ED PROVIDER NOTES
to be discharged to home.  She was referred to [facility] and saw Dr. Arvizu as an outpatient on February 7, 2 days ago.  She did have a [employer] mobile device which showed no evidence of atrial fibrillation thus far.  She was scheduled for an outpatient stress test which has not yet been completed.    2D echo cardiogram from February 6, 2024 was reviewed.  Conclusions Summary Normal left ventricle size, wall thickness, and systolic function with an estimated ejection fraction of 55-60%. No regional wall motion abnormalities are seen. Definity contrast agent was used to help visualize endocardial borders. Mitral annular calcification. Mild mitral regurgitation is present. No evidence of mitral stenosis.    CT chest PE protocol was reviewed from January 25, 2024.  EXAMINATION:  CTA OF THE CHEST 1/25/2024 4:08 pm     TECHNIQUE:  CTA of the chest was performed after the administration of intravenous  contrast.  Multiplanar reformatted images are provided for review.  MIP  images are provided for review. Automated exposure control, iterative  reconstruction, and/or weight based adjustment of the mA/kV was utilized to  reduce the radiation dose to as low as reasonably achievable.     COMPARISON:  None     HISTORY:  ORDERING SYSTEM PROVIDED HISTORY: rule out PE  TECHNOLOGIST PROVIDED HISTORY:  Reason for exam:->rule out PE  Additional Contrast?->1  Reason for Exam: rule out pe     FINDINGS:  Pulmonary Arteries: Pulmonary arteries are adequately opacified for  evaluation.  No evidence of intraluminal filling defect to suggest pulmonary  embolism.  Main pulmonary artery is normal in caliber.     Mediastinum: No evidence of mediastinal lymphadenopathy.  The heart and  pericardium demonstrate no acute abnormality.  There is no acute abnormality  of the thoracic aorta.     Lungs/pleura: The lungs are without acute process.  No focal consolidation or  pulmonary edema.  No evidence of pleural effusion or pneumothorax.    portions of this note were completed with a voice recognition program.  Efforts were made to edit the dictations but occasionally words are mis-transcribed.)    SATNAM AHMADI DO (electronically signed)  Attending Emergency Physician           Satnam Ahmadi,   02/09/24 1905       Satnam Ahmadi DO  02/09/24 2012

## 2024-02-09 NOTE — TELEPHONE ENCOUNTER
Called/spoke to PT.  She is really SOB.  Just sitting down and pains shooting up her neck.  PT has checked into the ER.

## 2024-02-09 NOTE — TELEPHONE ENCOUNTER
Patient already in the ER, suggested to patient to stay there in the ER and Dr. Arvizu will be notified, patient was agreeable

## 2024-02-09 NOTE — TELEPHONE ENCOUNTER
Sherron called in this afternoon, she states she is short of breath, this started about an half an hour ago, heart rate is between 120-130. She would like a return call.      She can be reached at [phone].

## 2024-02-10 VITALS
RESPIRATION RATE: 16 BRPM | OXYGEN SATURATION: 93 % | TEMPERATURE: 98 F | HEART RATE: 73 BPM | WEIGHT: 260.8 LBS | BODY MASS INDEX: 41.91 KG/M2 | SYSTOLIC BLOOD PRESSURE: 102 MMHG | DIASTOLIC BLOOD PRESSURE: 60 MMHG | HEIGHT: 66 IN

## 2024-02-10 PROBLEM — R06.00 DYSPNEA: Status: ACTIVE | Noted: 2024-02-10

## 2024-02-10 LAB
ANION GAP SERPL CALCULATED.3IONS-SCNC: 11 MMOL/L (ref 3–16)
BUN SERPL-MCNC: 15 MG/DL (ref 7–20)
CALCIUM SERPL-MCNC: 9 MG/DL (ref 8.3–10.6)
CHLORIDE SERPL-SCNC: 102 MMOL/L (ref 99–110)
CHOLEST SERPL-MCNC: 147 MG/DL (ref 0–199)
CO2 SERPL-SCNC: 24 MMOL/L (ref 21–32)
CREAT SERPL-MCNC: 0.7 MG/DL (ref 0.6–1.1)
DEPRECATED RDW RBC AUTO: 14 % (ref 12.4–15.4)
EKG ATRIAL RATE: 68 BPM
EKG ATRIAL RATE: 93 BPM
EKG DIAGNOSIS: NORMAL
EKG DIAGNOSIS: NORMAL
EKG P AXIS: 17 DEGREES
EKG P AXIS: 32 DEGREES
EKG P-R INTERVAL: 154 MS
EKG P-R INTERVAL: 156 MS
EKG Q-T INTERVAL: 362 MS
EKG Q-T INTERVAL: 404 MS
EKG QRS DURATION: 88 MS
EKG QRS DURATION: 94 MS
EKG QTC CALCULATION (BAZETT): 429 MS
EKG QTC CALCULATION (BAZETT): 450 MS
EKG R AXIS: -16 DEGREES
EKG R AXIS: -16 DEGREES
EKG T AXIS: -9 DEGREES
EKG T AXIS: 2 DEGREES
EKG VENTRICULAR RATE: 68 BPM
EKG VENTRICULAR RATE: 93 BPM
GFR SERPLBLD CREATININE-BSD FMLA CKD-EPI: >60 ML/MIN/{1.73_M2}
GLUCOSE SERPL-MCNC: 142 MG/DL (ref 70–99)
HCT VFR BLD AUTO: 38.4 % (ref 36–48)
HDLC SERPL-MCNC: 44 MG/DL (ref 40–60)
HGB BLD-MCNC: 13.2 G/DL (ref 12–16)
LDLC SERPL CALC-MCNC: 91 MG/DL
MCH RBC QN AUTO: 29.3 PG (ref 26–34)
MCHC RBC AUTO-ENTMCNC: 34.2 G/DL (ref 31–36)
MCV RBC AUTO: 85.8 FL (ref 80–100)
PLATELET # BLD AUTO: 221 K/UL (ref 135–450)
PMV BLD AUTO: 8.1 FL (ref 5–10.5)
POTASSIUM SERPL-SCNC: 3.8 MMOL/L (ref 3.5–5.1)
RBC # BLD AUTO: 4.48 M/UL (ref 4–5.2)
SODIUM SERPL-SCNC: 137 MMOL/L (ref 136–145)
TRIGL SERPL-MCNC: 60 MG/DL (ref 0–150)
VLDLC SERPL CALC-MCNC: 12 MG/DL
WBC # BLD AUTO: 6.3 K/UL (ref 4–11)

## 2024-02-10 PROCEDURE — 83036 HEMOGLOBIN GLYCOSYLATED A1C: CPT

## 2024-02-10 PROCEDURE — 96376 TX/PRO/DX INJ SAME DRUG ADON: CPT

## 2024-02-10 PROCEDURE — 6360000002 HC RX W HCPCS: Performed by: INTERNAL MEDICINE

## 2024-02-10 PROCEDURE — 6370000000 HC RX 637 (ALT 250 FOR IP): Performed by: INTERNAL MEDICINE

## 2024-02-10 PROCEDURE — 99223 1ST HOSP IP/OBS HIGH 75: CPT | Performed by: INTERNAL MEDICINE

## 2024-02-10 PROCEDURE — 80048 BASIC METABOLIC PNL TOTAL CA: CPT

## 2024-02-10 PROCEDURE — 85027 COMPLETE CBC AUTOMATED: CPT

## 2024-02-10 PROCEDURE — 36415 COLL VENOUS BLD VENIPUNCTURE: CPT

## 2024-02-10 PROCEDURE — 94760 N-INVAS EAR/PLS OXIMETRY 1: CPT

## 2024-02-10 PROCEDURE — 80061 LIPID PANEL: CPT

## 2024-02-10 PROCEDURE — 93010 ELECTROCARDIOGRAM REPORT: CPT | Performed by: INTERNAL MEDICINE

## 2024-02-10 PROCEDURE — G0378 HOSPITAL OBSERVATION PER HR: HCPCS

## 2024-02-10 PROCEDURE — 2580000003 HC RX 258: Performed by: INTERNAL MEDICINE

## 2024-02-10 PROCEDURE — 96374 THER/PROPH/DIAG INJ IV PUSH: CPT

## 2024-02-10 RX ORDER — METOPROLOL SUCCINATE 50 MG/1
50 TABLET, EXTENDED RELEASE ORAL DAILY
Status: DISCONTINUED | OUTPATIENT
Start: 2024-02-10 | End: 2024-02-10 | Stop reason: HOSPADM

## 2024-02-10 RX ORDER — METOPROLOL SUCCINATE 50 MG/1
50 TABLET, EXTENDED RELEASE ORAL DAILY
Qty: 30 TABLET | Refills: 3 | Status: SHIPPED | OUTPATIENT
Start: 2024-02-10

## 2024-02-10 RX ORDER — SPIRONOLACTONE 25 MG/1
25 TABLET ORAL DAILY
Qty: 30 TABLET | Refills: 5 | Status: SHIPPED | OUTPATIENT
Start: 2024-02-10 | End: 2025-02-09

## 2024-02-10 RX ORDER — ASPIRIN 81 MG/1
81 TABLET, CHEWABLE ORAL DAILY
Qty: 30 TABLET | Refills: 3 | Status: SHIPPED | OUTPATIENT
Start: 2024-02-11

## 2024-02-10 RX ORDER — ATORVASTATIN CALCIUM 40 MG/1
40 TABLET, FILM COATED ORAL NIGHTLY
Qty: 30 TABLET | Refills: 3 | Status: SHIPPED | OUTPATIENT
Start: 2024-02-10

## 2024-02-10 RX ORDER — TORSEMIDE 10 MG/1
10 TABLET ORAL DAILY
Qty: 30 TABLET | Refills: 5 | Status: SHIPPED
Start: 2024-02-10

## 2024-02-10 RX ADMIN — ACETAMINOPHEN 650 MG: 325 TABLET ORAL at 15:08

## 2024-02-10 RX ADMIN — LISINOPRIL 10 MG: 10 TABLET ORAL at 08:54

## 2024-02-10 RX ADMIN — ONDANSETRON 4 MG: 2 INJECTION INTRAMUSCULAR; INTRAVENOUS at 15:08

## 2024-02-10 RX ADMIN — PANTOPRAZOLE SODIUM 40 MG: 40 TABLET, DELAYED RELEASE ORAL at 05:15

## 2024-02-10 RX ADMIN — SPIRONOLACTONE 25 MG: 25 TABLET ORAL at 08:54

## 2024-02-10 RX ADMIN — ACETAMINOPHEN 650 MG: 325 TABLET ORAL at 05:28

## 2024-02-10 RX ADMIN — CARVEDILOL 3.12 MG: 3.12 TABLET, FILM COATED ORAL at 08:54

## 2024-02-10 RX ADMIN — ONDANSETRON 4 MG: 2 INJECTION INTRAMUSCULAR; INTRAVENOUS at 05:28

## 2024-02-10 RX ADMIN — ASPIRIN 81 MG: 81 TABLET, CHEWABLE ORAL at 08:54

## 2024-02-10 RX ADMIN — SODIUM CHLORIDE, PRESERVATIVE FREE 10 ML: 5 INJECTION INTRAVENOUS at 08:56

## 2024-02-10 ASSESSMENT — PAIN DESCRIPTION - LOCATION
LOCATION: HEAD
LOCATION: HEAD

## 2024-02-10 ASSESSMENT — PAIN SCALES - GENERAL
PAINLEVEL_OUTOF10: 0
PAINLEVEL_OUTOF10: 6
PAINLEVEL_OUTOF10: 4
PAINLEVEL_OUTOF10: 0

## 2024-02-10 ASSESSMENT — PAIN - FUNCTIONAL ASSESSMENT: PAIN_FUNCTIONAL_ASSESSMENT: ACTIVITIES ARE NOT PREVENTED

## 2024-02-10 ASSESSMENT — PAIN DESCRIPTION - DESCRIPTORS
DESCRIPTORS: ACHING
DESCRIPTORS: THROBBING

## 2024-02-10 NOTE — H&P
nonradiating not associated with any fevers, nausea, vomiting, diaphoresis.  She does admit to a dry cough but no history of allergies.   She denies any wheezing, frequent episodes of bronchitis.  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 She is also noticed intermittent palpitations and lightheadedness.  She thinks that her iWatch may have shown on a couple of occasions A-fib.  She also has had increased lower extremity edema with for which she was started on torsemide and Aldactone with improvement of her overall symptoms but did not going back to her baseline.  She has had an echo which was normal.  CT of the chest PE protocol was negative for any abnormalities in the chest including PE.  She was given a [employer] mobile device and so far no arrhythmia noticed.  She has had no pulmonary evaluation yet other than the CT of the chest.    In emergency room her temperature was 36.4, blood pressure 113/67, pulse 106, respirations 16, sat 97% on room air.       Review of Systems:        Pertinent positives and negatives discussed in HPI     Objective:   No intake or output data in the 24 hours ending 02/09/24 1935   Vitals:   Vitals:    02/09/24 1815 02/09/24 1830 02/09/24 1845 02/09/24 1846   BP: 115/75 112/74 117/72    Pulse: 78 76 81 80   Resp: 12 11 19 14   Temp:       SpO2: 93% 96% 97% 97%   Weight:           Medications Prior to Admission     Prior to Admission medications    Medication Sig Start Date End Date Taking? Authorizing Provider   Doxylamine Succinate, Sleep, (UNISOM PO) Take by mouth    Beto Lamar MD   prochlorperazine (COMPAZINE) 5 MG tablet Take 1 tablet by mouth as needed  Patient not taking: Reported on 2/9/2024 11/24/23   Beto Lamar MD   omeprazole (PRILOSEC) 20 MG delayed release capsule Take 1 capsule by mouth daily    Beto Lamar MD   Loratadine (CLARITIN PO) Take by mouth daily  Patient not taking: Reported on 2/9/2024    Provider, Historical, MD   b complex

## 2024-02-10 NOTE — CONSULTS
[facility]  Cardiology Consult Note        CC:      Tachycardia and chest pain             HPI:   This is a 53 y.o. female who has been seen in the office for the last several weeks comes in for evaluation of rapid heartbeat and chest pain.    She claims that she was on the computer on a Zoom call doing an online course when she got up for a break not feeling well.  She then started feeling bad and had heart rate going up the palpitations.  She took her pulse on the [employer] mobile device and it was up to 150 that alarmed her.  She also states that she had pressure in her chest going up her neck and down her arms.    In the ER that the enzymes are negative the EKG is normal  Past Medical History:   Diagnosis Date    Diastolic heart failure (HCC)       Past Surgical History:   Procedure Laterality Date    HYSTERECTOMY (CERVIX STATUS UNKNOWN)        Family History   Problem Relation Age of Onset    Heart Attack Father     Heart Disease Father     Heart Disease Brother       Social History     Tobacco Use    Smoking status: Never    Smokeless tobacco: Never   Vaping Use    Vaping Use: Never used   Substance Use Topics    Alcohol use: Yes     Alcohol/week: 3.0 standard drinks of alcohol     Types: 3 Glasses of wine per week     Comment: social    Drug use: Never      No Known Allergies   sodium chloride flush  5-40 mL IntraVENous 2 times per day    aspirin  81 mg Oral Daily    atorvastatin  40 mg Oral Nightly    carvedilol  3.125 mg Oral BID WC    enoxaparin  30 mg SubCUTAneous BID    lisinopril  10 mg Oral Daily    pantoprazole  40 mg Oral QAM AC    torsemide  10 mg Oral Daily    spironolactone  25 mg Oral Daily       Review of Systems -   Constitutional: Negative for weight gain/loss; malaise, fever  Respiratory: Negative for Asthma;  cough and hemoptysis  Cardiovascular: Negative for palpitations,dizziness   Gastrointestinal: Negative for abd.pain; constipation/diarrhea;    Genitourinary: Negative for

## 2024-02-10 NOTE — PLAN OF CARE
Problem: Discharge Planning  Goal: Discharge to home or other facility with appropriate resources  2/10/2024 1523 by Jeremias Jama RN  Outcome: Completed  2/10/2024 1018 by Jeremias Jama RN  Outcome: Progressing     Problem: Pain  Goal: Verbalizes/displays adequate comfort level or baseline comfort level  2/10/2024 1523 by Jeremias Jama RN  Outcome: Completed  2/10/2024 1018 by Jeremias Jama RN  Outcome: Progressing     Problem: Respiratory - Adult  Goal: Achieves optimal ventilation and oxygenation  2/10/2024 1523 by Jeremias Jama RN  Outcome: Completed  2/10/2024 1018 by Jeremias Jama RN  Outcome: Progressing     Problem: Cardiovascular - Adult  Goal: Maintains optimal cardiac output and hemodynamic stability  2/10/2024 1523 by Jeremias Jama RN  Outcome: Completed  2/10/2024 1018 by Jeremias Jama RN  Outcome: Progressing

## 2024-02-10 NOTE — PROGRESS NOTES
4 Eyes Skin Assessment     NAME:  Sherron Wright  YOB: 1970  MEDICAL RECORD NUMBER:  [id number]    The patient is being assessed for  Admission    I agree that at least one RN has performed a thorough Head to Toe Skin Assessment on the patient. ALL assessment sites listed below have been assessed.      Areas assessed by both nurses:    Head, Face, Ears, Shoulders, Back, Chest, Arms, Elbows, Hands, Sacrum. Buttock, Coccyx, Ischium, Legs. Feet and Heels, and Under Medical Devices         Does the Patient have a Wound? No noted wound(s)       Ramírez Prevention initiated by RN: No  Wound Care Orders initiated by RN: No    Pressure Injury (Stage 3,4, Unstageable, DTI, NWPT, and Complex wounds) if present, place Wound referral order by RN under : No    New Ostomies, if present place, Ostomy referral order under : No     Nurse 1 eSignature: Electronically signed by Isaura Aguero RN on 2/9/24 at 9:17 PM EST    **SHARE this note so that the co-signing nurse can place an eSignature**    Nurse 2 eSignature: Electronically signed by Jennifer Mata RN on 2/9/24 at 11:38 PM EST

## 2024-02-10 NOTE — PROGRESS NOTES
Patient admitted to room 4250 from ED. Patient alert and oriented x 4. Patient denies pain and shortness of breath at this time. Gait is steady. Patient education given for medications. VSS, on RA. Tele monitor placed. Call light within reach. Bed locked in lowest position.      Electronically signed by Isaura Aguero RN on 2/9/2024 at 9:17 PM

## 2024-02-10 NOTE — PROGRESS NOTES
Pt provided d/c instructions at this time, all questions answered. IV removed at this time without complication. Pt ambulatory to car. Electronically signed by Jeremias Jama RN on 2/10/2024 at 5:27 PM

## 2024-02-10 NOTE — PLAN OF CARE
Problem: Discharge Planning  Goal: Discharge to home or other facility with appropriate resources  2/9/2024 2241 by Isaura Aguero RN  Outcome: Progressing     Problem: Pain  Goal: Verbalizes/displays adequate comfort level or baseline comfort level  Outcome: Progressing     Problem: Respiratory - Adult  Goal: Achieves optimal ventilation and oxygenation  Outcome: Progressing     Problem: Cardiovascular - Adult  Goal: Maintains optimal cardiac output and hemodynamic stability  Outcome: Progressing

## 2024-02-10 NOTE — PROGRESS NOTES
Medication Reconciliation    List of medications patient is currently taking is complete.     Source of information: 1. Conversation with patient at bedside                                      2. EPIC records           Notes regarding home medications:   1. Patient reports taking torsemide 10 mg on  M,W,F AND Spironolactone 25 mg on T,TH  2. Patient reports only taking torsemide 10 mg PTA.      Constance Ohara, Pharmacy Intern   2/9/2024  7:35 PM

## 2024-02-10 NOTE — PLAN OF CARE
Problem: Discharge Planning  Goal: Discharge to home or other facility with appropriate resources  2/10/2024 1018 by Jeremias Jama RN  Outcome: Progressing  2/9/2024 2241 by Isaura Aguero RN  Outcome: Progressing  2/9/2024 2241 by Isaura Aguero RN  Outcome: Progressing     Problem: Pain  Goal: Verbalizes/displays adequate comfort level or baseline comfort level  2/10/2024 1018 by Jeremias Jama RN  Outcome: Progressing  2/9/2024 2241 by Isaura Aguero RN  Outcome: Progressing     Problem: Respiratory - Adult  Goal: Achieves optimal ventilation and oxygenation  2/10/2024 1018 by Jeremias Jama RN  Outcome: Progressing  2/9/2024 2241 by Isaura Aguero RN  Outcome: Progressing     Problem: Cardiovascular - Adult  Goal: Maintains optimal cardiac output and hemodynamic stability  2/10/2024 1018 by Jeremias Jama RN  Outcome: Progressing  2/9/2024 2241 by Isaura Aguero RN  Outcome: Progressing

## 2024-02-10 NOTE — PROGRESS NOTES
V2.0  [facility] [place] Progress Note      Name:  Sherron Wright /Age/Sex: 1970  (53 y.o. female)   MRN & CSN:  [id number] & [id number] Encounter Date/Time: 2/10/2024 11:04 AM EST    Location:  [id number] PCP: Semaj Rivas MD       Hospital Day: 2    Assessment and Plan:   Sherron Wright is a 53 y.o. female with medical history significant for morbid obesity, GERD who was admitted with chest pressure radiating to the neck and arm, dyspnea on exertion and intermittent palpitation.    Assessment/Plan :   1.  Chest pressure radiating to the neck and arms/dyspnea on exertion and intermittent palpitations.  EKG, echo and CTA chest all unremarkable.  No tachyarrhythmia on [employer] mobile device.  She was recommended cardiac stress test by her cardiologist.  Other possibilities include physical deconditioning, GERD, anxiety.  She has no history of asthma and her lung parenchyma is unremarkable on CTA chest.    2.  GERD.  Continue PPI    3.  Morbid obesity with BMI of 42.09.  She might need sleep study as outpatient.    4.  Fasting hyperglycemia.  Check A1c      DVT prophylaxis:Lovenox     Disposition: 1 to 2 days.        Subjective:     Chief Complaint: Chest pressure    HPI:Sherron Wright is a 53 y.o. female who presents with chest pressure, dyspnea on exertion and intermittent palpitation.  She reports dyspnea on exertion which is new for her.  Also report the pressure in her chest which radiates to neck in both arms.         Review of Systems:    As above     Objective:   No intake or output data in the 24 hours ending 02/10/24 1104     Vitals:   Vitals:    02/10/24 0732   BP: 115/62   Pulse: 75   Resp: 16   Temp: 97.9 °F (36.6 °C)   SpO2: 92%       Physical Exam:     General: NAD  Eyes: EOMI  ENT: neck supple  Cardiovascular: Regular rate.  Respiratory: Clear to auscultation  Gastrointestinal: Soft, non tender  Genitourinary: no suprapubic tenderness  Musculoskeletal: No edema  Skin: warm,  Platelets 243 135 - 450 K/uL    MPV 8.0 5.0 - 10.5 fL    Neutrophils % 68.4 %    Lymphocytes % 21.8 %    Monocytes % 8.3 %    Eosinophils % 1.2 %    Basophils % 0.3 %    Neutrophils Absolute 6.4 1.7 - 7.7 K/uL    Lymphocytes Absolute 2.0 1.0 - 5.1 K/uL    Monocytes Absolute 0.8 0.0 - 1.3 K/uL    Eosinophils Absolute 0.1 0.0 - 0.6 K/uL    Basophils Absolute 0.0 0.0 - 0.2 K/uL   CMP w/ Reflex to MG    Collection Time: 02/09/24  5:12 PM   Result Value Ref Range    Sodium 136 136 - 145 mmol/L    Potassium reflex Magnesium 3.8 3.5 - 5.1 mmol/L    Chloride 97 (L) 99 - 110 mmol/L    CO2 27 21 - 32 mmol/L    Anion Gap 12 3 - 16    Glucose 82 70 - 99 mg/dL    BUN 18 7 - 20 mg/dL    Creatinine 0.8 0.6 - 1.1 mg/dL    Est, Glom Filt Rate >60 >60    Calcium 9.6 8.3 - 10.6 mg/dL    Total Protein 7.3 6.4 - 8.2 g/dL    Albumin 4.5 3.4 - 5.0 g/dL    Albumin/Globulin Ratio 1.6 1.1 - 2.2    Total Bilirubin <0.2 0.0 - 1.0 mg/dL    Alkaline Phosphatase 84 40 - 129 U/L    ALT 12 10 - 40 U/L    AST 18 15 - 37 U/L   Troponin    Collection Time: 02/09/24  5:12 PM   Result Value Ref Range    Troponin, High Sensitivity 8 0 - 14 ng/L   BNP    Collection Time: 02/09/24  5:12 PM   Result Value Ref Range    Pro-BNP <36 0 - 124 pg/mL   Troponin    Collection Time: 02/09/24  6:19 PM   Result Value Ref Range    Troponin, High Sensitivity 7 0 - 14 ng/L   EKG 12 lead - Serial    Collection Time: 02/09/24  7:41 PM   Result Value Ref Range    Ventricular Rate 68 BPM    Atrial Rate 68 BPM    P-R Interval 154 ms    QRS Duration 94 ms    Q-T Interval 404 ms    QTc Calculation (Bazett) 429 ms    P Axis 17 degrees    R Axis -16 degrees    T Axis -9 degrees    Diagnosis       Normal sinus rhythmMinimal voltage criteria for LVH, may be normal variant ( R in aVL )Borderline ECGWhen compared with ECG of 09-FEB-2024 16:23, (unconfirmed)No significant change was found   D-dimer, quantitative    Collection Time: 02/09/24  9:18 PM   Result Value Ref Range

## 2024-02-11 LAB
EST. AVERAGE GLUCOSE BLD GHB EST-MCNC: 96.8 MG/DL
HBA1C MFR BLD: 5 %

## 2024-02-12 ENCOUNTER — CARE COORDINATION (OUTPATIENT)
Dept: OTHER | Facility: CLINIC | Age: 54
End: 2024-02-12

## 2024-02-12 NOTE — CARE COORDINATION
Patient's chart has been reviewed for Care Coordination needs.  Patient will not be enrolled in Care Coordination due to : Other Pt's benefits BCBS/ Jose  .

## 2024-02-12 NOTE — DISCHARGE SUMMARY
V2.0  Discharge Summary    Name:  Sherron Wright /Age/Sex: 1970 (53 y.o. female)   Admit Date: 2024  Discharge Date: 24    MRN & CSN:  [id number] & [id number] Encounter Date and Time 24 5:33 PM EST    Attending:  No att. providers found Discharging Provider: Yvonne Greenberg MD       Hospital Course:     Brief HPI:Sherron Wright is a 53 y.o. female with medical history significant for morbid obesity, GERD who was admitted with chest pressure radiating to the neck and arm, dyspnea on exertion and intermittent palpitation.     Assessment/Plan :   1.  Chest pressure radiating to the neck and arms/dyspnea on exertion and intermittent palpitations.  EKG, echo and CTA chest all unremarkable.  No tachyarrhythmia on [employer] mobile device.  She was recommended cardiac stress test by her cardiologist.  Other possibilities include physical deconditioning, GERD, anxiety.  She has no history of asthma and her lung parenchyma is unremarkable on CTA chest.  She was seen by cardiology and recommended stress test as outpatient.     2.  GERD.  Continue PPI     3.  Morbid obesity with BMI of 42.09.  She might need sleep study as outpatient.     4.  Fasting hyperglycemia.  Check A1c    The patient was discharged without informing me in the evening of 2/10/2024.  Probably the discharge order came from cardiology without informing the primary team.    Consults this admission:  IP CONSULT TO CARDIOLOGY    Discharge Diagnosis:   Atypical chest pain    Discharge Instruction:   Follow up appointments: Cardiology  Primary care physician: Semaj Rivas MD within 2 weeks  Diet: regular diet   Activity: activity as tolerated  Disposition: Discharged to:   [x]Home, []C, []SNF, []Acute Rehab, []Hospice   Condition on discharge: Stable  Labs and Tests to be Followed up as an outpatient by PCP or Specialist:     Discharge Medications:        Medication List        START taking these medications      aspirin 81 MG

## 2024-02-16 ENCOUNTER — HOSPITAL ENCOUNTER (OUTPATIENT)
Dept: NON INVASIVE DIAGNOSTICS | Age: 54
Discharge: HOME | End: 2024-02-16

## 2024-02-16 DIAGNOSIS — R07.9 CHEST PAIN IN ADULT: ICD-10-CM

## 2024-02-16 PROCEDURE — 93017 CV STRESS TEST TRACING ONLY: CPT

## 2024-02-22 ENCOUNTER — PATIENT MESSAGE (OUTPATIENT)
Dept: CARDIOLOGY CLINIC | Age: 54
End: 2024-02-22

## 2024-02-22 NOTE — TELEPHONE ENCOUNTER
From: Sherron Wright  To: Dr. Dallin Arvizu  Sent: 2/22/2024 8:32 AM EST  Subject: Blood pressure/HR    Hello. I have been very tired recently recently, so I've been checking my blood pressure sometimes. I have attached today's below. I take my heart medicines at night only my water pills during the day.     my blood pressure on the attached picture from top to bottom are standing, sitting,standing, sitting.  Also my heart rate at night sometimes is betweeen 46- 50's.

## 2024-02-28 NOTE — PROGRESS NOTES
[facility]  Cardiology Note      Sherron Wright  1970, 53 y.o.        CC:  Chest pain             Semaj Rivas MD:      HPI:   This is a 53 y.o. female who presented to the ER for irregular heart rate, fatigue and chest pressure, she also is stating shortness of breath that has been increasing over the last week.    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  She has noticed shortness of breath that is getting progressively worse.  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  She also describes a pressure in her chest which is present constantly and not necessarily made worse with exertion..  She is not a diabetic does not smoke but her father had open heart surgery in her 40s.  She also describes palpitations and she claims that her phone tells her that she may have A-fib.    In the emergency room she was found to have a proBNP of greater than one thousand. 1/25/24   <36  2/9/24    On her last visit we ordered an echocardiogram which shows normal systolic function .  She also had an exercise stress test which was negative for ischemia at 6 minutes of exercise.  Patient was treated for shortness of breath and elevated proBNP with torsemide and spironolactone each 3 days a week.  I think this has helped her breathe better but she gets lightheaded now      Past Medical History:   Diagnosis Date    Diastolic heart failure (HCC)       Past Surgical History:   Procedure Laterality Date    HYSTERECTOMY (CERVIX STATUS UNKNOWN)        Family History   Problem Relation Age of Onset    Heart Attack Father     Heart Disease Father     Heart Disease Brother       Social History     Tobacco Use    Smoking status: Never    Smokeless tobacco: Never   Vaping Use    Vaping Use: Never used   Substance Use Topics    Alcohol use: Yes     Alcohol/week: 3.0 standard drinks of alcohol     Types: 3 Glasses of wine per week

## 2024-03-01 ENCOUNTER — OFFICE VISIT (OUTPATIENT)
Dept: CARDIOLOGY CLINIC | Age: 54
End: 2024-03-01

## 2024-03-01 VITALS
WEIGHT: 262.2 LBS | DIASTOLIC BLOOD PRESSURE: 70 MMHG | BODY MASS INDEX: 42.14 KG/M2 | HEART RATE: 73 BPM | SYSTOLIC BLOOD PRESSURE: 110 MMHG | HEIGHT: 66 IN | OXYGEN SATURATION: 96 %

## 2024-03-01 DIAGNOSIS — I50.30 DIASTOLIC HEART FAILURE, UNSPECIFIED HF CHRONICITY (HCC): ICD-10-CM

## 2024-03-01 DIAGNOSIS — I50.30 DIASTOLIC HEART FAILURE, UNSPECIFIED HF CHRONICITY (HCC): Primary | ICD-10-CM

## 2024-03-01 LAB
ALBUMIN SERPL-MCNC: 4.6 G/DL (ref 3.4–5)
ANION GAP SERPL CALCULATED.3IONS-SCNC: 10 MMOL/L (ref 3–16)
BUN SERPL-MCNC: 13 MG/DL (ref 7–20)
CALCIUM SERPL-MCNC: 9.6 MG/DL (ref 8.3–10.6)
CHLORIDE SERPL-SCNC: 105 MMOL/L (ref 99–110)
CO2 SERPL-SCNC: 28 MMOL/L (ref 21–32)
CREAT SERPL-MCNC: 0.8 MG/DL (ref 0.6–1.1)
GFR SERPLBLD CREATININE-BSD FMLA CKD-EPI: >60 ML/MIN/{1.73_M2}
GLUCOSE SERPL-MCNC: 108 MG/DL (ref 70–99)
NT-PROBNP SERPL-MCNC: 154 PG/ML (ref 0–124)
PHOSPHATE SERPL-MCNC: 3 MG/DL (ref 2.5–4.9)
POTASSIUM SERPL-SCNC: 5 MMOL/L (ref 3.5–5.1)
SODIUM SERPL-SCNC: 143 MMOL/L (ref 136–145)

## 2024-03-01 PROCEDURE — 99214 OFFICE O/P EST MOD 30 MIN: CPT | Performed by: INTERNAL MEDICINE

## 2024-04-01 ENCOUNTER — HOSPITAL ENCOUNTER (INPATIENT)
Age: 54
LOS: 1 days | Discharge: HOME | DRG: 309 | End: 2024-04-03
Attending: EMERGENCY MEDICINE | Admitting: STUDENT IN AN ORGANIZED HEALTH CARE EDUCATION/TRAINING PROGRAM

## 2024-04-01 ENCOUNTER — APPOINTMENT (OUTPATIENT)
Dept: GENERAL RADIOLOGY | Age: 54
DRG: 309 | End: 2024-04-01

## 2024-04-01 ENCOUNTER — PATIENT MESSAGE (OUTPATIENT)
Dept: CARDIOLOGY CLINIC | Age: 54
End: 2024-04-01

## 2024-04-01 DIAGNOSIS — R07.89 CHEST WALL PAIN: ICD-10-CM

## 2024-04-01 DIAGNOSIS — Z71.89 GOALS OF CARE, COUNSELING/DISCUSSION: ICD-10-CM

## 2024-04-01 DIAGNOSIS — I48.91 ATRIAL FIBRILLATION, UNSPECIFIED TYPE (HCC): Primary | ICD-10-CM

## 2024-04-01 LAB
ALBUMIN SERPL-MCNC: 4.1 G/DL (ref 3.4–5)
ALBUMIN/GLOB SERPL: 1.5 {RATIO} (ref 1.1–2.2)
ALP SERPL-CCNC: 96 U/L (ref 40–129)
ALT SERPL-CCNC: 14 U/L (ref 10–40)
ANION GAP SERPL CALCULATED.3IONS-SCNC: 17 MMOL/L (ref 3–16)
AST SERPL-CCNC: 21 U/L (ref 15–37)
BASOPHILS # BLD: 0.1 K/UL (ref 0–0.2)
BASOPHILS NFR BLD: 0.5 %
BILIRUB SERPL-MCNC: 0.3 MG/DL (ref 0–1)
BUN SERPL-MCNC: 13 MG/DL (ref 7–20)
CALCIUM SERPL-MCNC: 9.5 MG/DL (ref 8.3–10.6)
CHLORIDE SERPL-SCNC: 104 MMOL/L (ref 99–110)
CO2 SERPL-SCNC: 22 MMOL/L (ref 21–32)
CREAT SERPL-MCNC: 0.8 MG/DL (ref 0.6–1.1)
DEPRECATED RDW RBC AUTO: 14.3 % (ref 12.4–15.4)
EKG DIAGNOSIS: NORMAL
EKG Q-T INTERVAL: 288 MS
EKG QRS DURATION: 88 MS
EKG QTC CALCULATION (BAZETT): 468 MS
EKG R AXIS: 30 DEGREES
EKG T AXIS: 29 DEGREES
EKG VENTRICULAR RATE: 159 BPM
EOSINOPHIL # BLD: 0.2 K/UL (ref 0–0.6)
EOSINOPHIL NFR BLD: 1.5 %
FLUAV RNA UPPER RESP QL NAA+PROBE: NEGATIVE
FLUBV AG NPH QL: NEGATIVE
GFR SERPLBLD CREATININE-BSD FMLA CKD-EPI: 87 ML/MIN/{1.73_M2}
GLUCOSE SERPL-MCNC: 128 MG/DL (ref 70–99)
HCT VFR BLD AUTO: 43.2 % (ref 36–48)
HGB BLD-MCNC: 14.6 G/DL (ref 12–16)
LYMPHOCYTES # BLD: 2.2 K/UL (ref 1–5.1)
LYMPHOCYTES NFR BLD: 21.5 %
MCH RBC QN AUTO: 30 PG (ref 26–34)
MCHC RBC AUTO-ENTMCNC: 33.9 G/DL (ref 31–36)
MCV RBC AUTO: 88.7 FL (ref 80–100)
MONOCYTES # BLD: 0.7 K/UL (ref 0–1.3)
MONOCYTES NFR BLD: 6.4 %
NEUTROPHILS # BLD: 7.1 K/UL (ref 1.7–7.7)
NEUTROPHILS NFR BLD: 70.1 %
NT-PROBNP SERPL-MCNC: 482 PG/ML (ref 0–124)
PLATELET # BLD AUTO: 283 K/UL (ref 135–450)
PMV BLD AUTO: 8.5 FL (ref 5–10.5)
POTASSIUM SERPL-SCNC: 4 MMOL/L (ref 3.5–5.1)
PROT SERPL-MCNC: 6.9 G/DL (ref 6.4–8.2)
RBC # BLD AUTO: 4.87 M/UL (ref 4–5.2)
SARS-COV-2 RDRP RESP QL NAA+PROBE: NOT DETECTED
SODIUM SERPL-SCNC: 143 MMOL/L (ref 136–145)
TROPONIN, HIGH SENSITIVITY: 10 NG/L (ref 0–14)
TROPONIN, HIGH SENSITIVITY: 9 NG/L (ref 0–14)
TSH SERPL DL<=0.005 MIU/L-ACNC: 1.68 UIU/ML (ref 0.27–4.2)
WBC # BLD AUTO: 10.2 K/UL (ref 4–11)

## 2024-04-01 PROCEDURE — 93005 ELECTROCARDIOGRAM TRACING: CPT | Performed by: EMERGENCY MEDICINE

## 2024-04-01 PROCEDURE — 93010 ELECTROCARDIOGRAM REPORT: CPT | Performed by: INTERNAL MEDICINE

## 2024-04-01 PROCEDURE — 96376 TX/PRO/DX INJ SAME DRUG ADON: CPT

## 2024-04-01 PROCEDURE — 36415 COLL VENOUS BLD VENIPUNCTURE: CPT

## 2024-04-01 PROCEDURE — 80053 COMPREHEN METABOLIC PANEL: CPT

## 2024-04-01 PROCEDURE — G0378 HOSPITAL OBSERVATION PER HR: HCPCS

## 2024-04-01 PROCEDURE — 71045 X-RAY EXAM CHEST 1 VIEW: CPT

## 2024-04-01 PROCEDURE — 96372 THER/PROPH/DIAG INJ SC/IM: CPT

## 2024-04-01 PROCEDURE — 84443 ASSAY THYROID STIM HORMONE: CPT

## 2024-04-01 PROCEDURE — 2580000003 HC RX 258: Performed by: HOSPITALIST

## 2024-04-01 PROCEDURE — 84484 ASSAY OF TROPONIN QUANT: CPT

## 2024-04-01 PROCEDURE — 2500000003 HC RX 250 WO HCPCS

## 2024-04-01 PROCEDURE — 87635 SARS-COV-2 COVID-19 AMP PRB: CPT

## 2024-04-01 PROCEDURE — 6360000002 HC RX W HCPCS: Performed by: HOSPITALIST

## 2024-04-01 PROCEDURE — 96374 THER/PROPH/DIAG INJ IV PUSH: CPT

## 2024-04-01 PROCEDURE — 87804 INFLUENZA ASSAY W/OPTIC: CPT

## 2024-04-01 PROCEDURE — 96375 TX/PRO/DX INJ NEW DRUG ADDON: CPT

## 2024-04-01 PROCEDURE — 85025 COMPLETE CBC W/AUTO DIFF WBC: CPT

## 2024-04-01 PROCEDURE — 99285 EMERGENCY DEPT VISIT HI MDM: CPT

## 2024-04-01 PROCEDURE — 2500000003 HC RX 250 WO HCPCS: Performed by: HOSPITALIST

## 2024-04-01 PROCEDURE — 83880 ASSAY OF NATRIURETIC PEPTIDE: CPT

## 2024-04-01 RX ORDER — POTASSIUM CHLORIDE 7.45 MG/ML
10 INJECTION INTRAVENOUS PRN
Status: DISCONTINUED | OUTPATIENT
Start: 2024-04-01 | End: 2024-04-03 | Stop reason: HOSPADM

## 2024-04-01 RX ORDER — MAGNESIUM SULFATE IN WATER 40 MG/ML
2000 INJECTION, SOLUTION INTRAVENOUS PRN
Status: DISCONTINUED | OUTPATIENT
Start: 2024-04-01 | End: 2024-04-03 | Stop reason: HOSPADM

## 2024-04-01 RX ORDER — METOPROLOL TARTRATE 1 MG/ML
5 INJECTION, SOLUTION INTRAVENOUS ONCE
Status: COMPLETED | OUTPATIENT
Start: 2024-04-01 | End: 2024-04-01

## 2024-04-01 RX ORDER — ACETAMINOPHEN 325 MG/1
650 TABLET ORAL EVERY 6 HOURS PRN
Status: DISCONTINUED | OUTPATIENT
Start: 2024-04-01 | End: 2024-04-03 | Stop reason: HOSPADM

## 2024-04-01 RX ORDER — ONDANSETRON 2 MG/ML
4 INJECTION INTRAMUSCULAR; INTRAVENOUS EVERY 6 HOURS PRN
Status: DISCONTINUED | OUTPATIENT
Start: 2024-04-01 | End: 2024-04-03 | Stop reason: HOSPADM

## 2024-04-01 RX ORDER — SODIUM CHLORIDE 0.9 % (FLUSH) 0.9 %
5-40 SYRINGE (ML) INJECTION PRN
Status: DISCONTINUED | OUTPATIENT
Start: 2024-04-01 | End: 2024-04-03 | Stop reason: HOSPADM

## 2024-04-01 RX ORDER — ESOMEPRAZOLE MAGNESIUM 20 MG/1
20 GRANULE, DELAYED RELEASE ORAL DAILY
COMMUNITY

## 2024-04-01 RX ORDER — ASPIRIN 81 MG/1
81 TABLET, CHEWABLE ORAL DAILY
Status: DISCONTINUED | OUTPATIENT
Start: 2024-04-02 | End: 2024-04-03 | Stop reason: HOSPADM

## 2024-04-01 RX ORDER — DILTIAZEM HYDROCHLORIDE 5 MG/ML
10 INJECTION INTRAVENOUS ONCE
Status: COMPLETED | OUTPATIENT
Start: 2024-04-01 | End: 2024-04-01

## 2024-04-01 RX ORDER — ENOXAPARIN SODIUM 100 MG/ML
30 INJECTION SUBCUTANEOUS 2 TIMES DAILY
Status: DISCONTINUED | OUTPATIENT
Start: 2024-04-01 | End: 2024-04-03 | Stop reason: HOSPADM

## 2024-04-01 RX ORDER — ACETAMINOPHEN 650 MG/1
650 SUPPOSITORY RECTAL EVERY 6 HOURS PRN
Status: DISCONTINUED | OUTPATIENT
Start: 2024-04-01 | End: 2024-04-03 | Stop reason: HOSPADM

## 2024-04-01 RX ORDER — POTASSIUM CHLORIDE 20 MEQ/1
40 TABLET, EXTENDED RELEASE ORAL PRN
Status: DISCONTINUED | OUTPATIENT
Start: 2024-04-01 | End: 2024-04-03 | Stop reason: HOSPADM

## 2024-04-01 RX ORDER — METOPROLOL SUCCINATE 50 MG/1
50 TABLET, EXTENDED RELEASE ORAL DAILY
Status: DISCONTINUED | OUTPATIENT
Start: 2024-04-02 | End: 2024-04-03 | Stop reason: HOSPADM

## 2024-04-01 RX ORDER — ATORVASTATIN CALCIUM 40 MG/1
40 TABLET, FILM COATED ORAL NIGHTLY
Status: DISCONTINUED | OUTPATIENT
Start: 2024-04-01 | End: 2024-04-03 | Stop reason: HOSPADM

## 2024-04-01 RX ORDER — SODIUM CHLORIDE 0.9 % (FLUSH) 0.9 %
5-40 SYRINGE (ML) INJECTION EVERY 12 HOURS SCHEDULED
Status: DISCONTINUED | OUTPATIENT
Start: 2024-04-01 | End: 2024-04-03 | Stop reason: HOSPADM

## 2024-04-01 RX ORDER — POLYETHYLENE GLYCOL 3350 17 G/17G
17 POWDER, FOR SOLUTION ORAL DAILY PRN
Status: DISCONTINUED | OUTPATIENT
Start: 2024-04-01 | End: 2024-04-03 | Stop reason: HOSPADM

## 2024-04-01 RX ORDER — ESOMEPRAZOLE MAGNESIUM 20 MG/1
20 GRANULE, DELAYED RELEASE ORAL DAILY
Status: DISCONTINUED | OUTPATIENT
Start: 2024-04-02 | End: 2024-04-01

## 2024-04-01 RX ORDER — ONDANSETRON 4 MG/1
4 TABLET, ORALLY DISINTEGRATING ORAL EVERY 8 HOURS PRN
Status: DISCONTINUED | OUTPATIENT
Start: 2024-04-01 | End: 2024-04-03 | Stop reason: HOSPADM

## 2024-04-01 RX ORDER — SODIUM CHLORIDE 9 MG/ML
INJECTION, SOLUTION INTRAVENOUS PRN
Status: DISCONTINUED | OUTPATIENT
Start: 2024-04-01 | End: 2024-04-03 | Stop reason: HOSPADM

## 2024-04-01 RX ADMIN — Medication 10 ML: at 21:11

## 2024-04-01 RX ADMIN — DILTIAZEM HYDROCHLORIDE 10 MG: 5 INJECTION, SOLUTION INTRAVENOUS at 12:05

## 2024-04-01 RX ADMIN — DILTIAZEM HYDROCHLORIDE 10 MG: 5 INJECTION, SOLUTION INTRAVENOUS at 13:08

## 2024-04-01 RX ADMIN — ENOXAPARIN SODIUM 30 MG: 100 INJECTION SUBCUTANEOUS at 21:11

## 2024-04-01 RX ADMIN — METOPROLOL TARTRATE 5 MG: 5 INJECTION INTRAVENOUS at 17:02

## 2024-04-01 ASSESSMENT — PAIN SCALES - GENERAL
PAINLEVEL_OUTOF10: 5
PAINLEVEL_OUTOF10: 0
PAINLEVEL_OUTOF10: 0

## 2024-04-01 ASSESSMENT — LIFESTYLE VARIABLES
HOW OFTEN DO YOU HAVE A DRINK CONTAINING ALCOHOL: MONTHLY OR LESS
HOW MANY STANDARD DRINKS CONTAINING ALCOHOL DO YOU HAVE ON A TYPICAL DAY: 1 OR 2

## 2024-04-01 NOTE — TELEPHONE ENCOUNTER
Patient phoned office c/o her watch monitor she was told by Dr. Arvizu to download is showing she is in Afib.  She is also having some shortness of breath. Patient was advised to go to the ER.  She has been seen for irregular heartbeat but not Afib.  Patient was agreeable to go to the ER at this time.

## 2024-04-01 NOTE — ED NOTES
Pt resting in bed at this time, laying in a supine position with head of bed elevated . Call light remains in reach instructed pt how to use, and encouraged pt to call if needed assistance, no distress noted. RR even and unlabored, skin warm and dry. No needs at this time. Will continue to monitor closely.

## 2024-04-01 NOTE — H&P
History and Physical  Delores Mcgraw MD, FACP      Name:  Sherron Wright /Age/Sex: 1970  (53 y.o. female)   MRN & CSN:  [id number] & [id number] Admission Date/Time: 2024 11:33 AM   Location:   PCP: Semaj Rivas MD       Hospital Day: 1    Assessment and Plan:     Sherron Wright is a 53 y.o.  female  who presents with was admitted with atrial fibrillation with RVR    New onset atrial fibrillation with RVR  Marcelo Vasc 2 with 2.2% risk of thromboembolism  May benefit from some sort of anticoagulation  Not ready interested in starting NOAC  Started the patient on aspirin 81 mg p.o. daily  Resume metoprolol 50 mg p.o. daily  IV as needed Cardizem as needed for rate control  Had a recent echocardiogram last month no need to repeat echocardiogram at this time  Cardiology consult  Continue telemonitoring  Check TSH  Monitor vitals  Admit as Obs for now     Diastolic heart failure  Follows up with cardiology as outpatient  Was on metoprolol, lisinopril, spironolactone, torsemide  Lisinopril recently held given soft blood pressure  Resume metoprolol  Await further recommendation from cardiology holding spironolactone and torsemide given softer blood pressure  Continue GDMT as tolerated  Plan noted to start Jardiance as outpatient  No need to repeat echo    GERD  Continue PPI    Morbid obesity  BMI of 42.9  May benefit from sleep study as an outpatient    Plan of care coordinated with the patient further management as per patient progress clinical improvement lab finding        Body mass index is 41.88 kg/m².    Diet ADULT DIET; Regular; Low Sodium (2 gm)   DVT Prophylaxis [x] Lovenox, []  Heparin, [] SCDs, [] Ambulation   GI Prophylaxis [] PPI,  [] H2 Blocker,  [] Carafate,  [] Diet/Tube Feeds   Code Status Patient's code status was discussed and is Full Code   Disposition Home in 1-2 days   MDM [] Low, [x] Moderate,[]  High  Patient's risk as above due to above     Patient is in agreement

## 2024-04-01 NOTE — ED PROVIDER NOTES
Prescriptions    No medications on file       The patient's blood pressure was found to be elevated according to CMS/Medicare and the Affordable Care Act/ObamaCare criteria. Elevated blood pressure could occur because of pain or anxiety or other reasons and does not mean that they need to have their blood pressure treated or medications otherwise adjusted. However, this could also be a sign that they will need to have their blood pressure treated or medications changed.    The patient was instructed to follow up closely with their personal physician to have their blood pressure rechecked. The patient was instructed to take a list of recent blood pressure readings to their next visit with their personal physician.    Patient refused pain medicines at the time of their exam.    IMPRESSION(S):  1. Atrial fibrillation, unspecified type (HCC)    2. Chest wall pain    3. Goals of care, counseling/discussion      ?  Recheck Times: 1315  Critical Care Time: 15 minutes       Kareem Camacho DO  04/01/24 0288    
counseling/discussion          DISPOSITION/PLAN     DISPOSITION Admitted 04/01/2024 01:28:29 PM      PATIENT REFERRED TO:  No follow-up provider specified.    DISCHARGE MEDICATIONS:  Current Discharge Medication List          DISCONTINUED MEDICATIONS:  Current Discharge Medication List        STOP taking these medications       aspirin 81 MG chewable tablet Comments:   Reason for Stopping:         omeprazole (PRILOSEC) 20 MG delayed release capsule Comments:   Reason for Stopping:         lisinopril (PRINIVIL;ZESTRIL) 20 MG tablet Comments:   Reason for Stopping:                      (Please note that portions of this note were completed with a voice recognition program.  Efforts were made to edit the dictations but occasionally words are mis-transcribed.)    Nova Valdivia PA-C (electronically signed)     Nova Valdivia PA-C  04/01/24 6158

## 2024-04-01 NOTE — ED NOTES
Dr. Camacho is at the bedside.   Pt resting in bed at this time, laying in a supine position with head of bed elevated . Call light remains in reach instructed pt how to use, and encouraged pt to call if needed assistance, no distress noted. RR even and unlabored, skin warm and dry. No needs at this time. Will continue to monitor closely.

## 2024-04-02 PROBLEM — I48.91 ATRIAL FIBRILLATION WITH RAPID VENTRICULAR RESPONSE (HCC): Status: ACTIVE | Noted: 2024-04-02

## 2024-04-02 LAB
ANION GAP SERPL CALCULATED.3IONS-SCNC: 10 MMOL/L (ref 3–16)
BUN SERPL-MCNC: 15 MG/DL (ref 7–20)
CALCIUM SERPL-MCNC: 9 MG/DL (ref 8.3–10.6)
CHLORIDE SERPL-SCNC: 106 MMOL/L (ref 99–110)
CO2 SERPL-SCNC: 26 MMOL/L (ref 21–32)
CREAT SERPL-MCNC: 0.6 MG/DL (ref 0.6–1.1)
DEPRECATED RDW RBC AUTO: 14.7 % (ref 12.4–15.4)
EKG ATRIAL RATE: 70 BPM
EKG DIAGNOSIS: NORMAL
EKG DIAGNOSIS: NORMAL
EKG P AXIS: 7 DEGREES
EKG P-R INTERVAL: 152 MS
EKG Q-T INTERVAL: 340 MS
EKG Q-T INTERVAL: 404 MS
EKG QRS DURATION: 90 MS
EKG QRS DURATION: 98 MS
EKG QTC CALCULATION (BAZETT): 411 MS
EKG QTC CALCULATION (BAZETT): 436 MS
EKG R AXIS: -15 DEGREES
EKG R AXIS: 19 DEGREES
EKG T AXIS: -4 DEGREES
EKG T AXIS: 30 DEGREES
EKG VENTRICULAR RATE: 70 BPM
EKG VENTRICULAR RATE: 88 BPM
GFR SERPLBLD CREATININE-BSD FMLA CKD-EPI: >90 ML/MIN/{1.73_M2}
GLUCOSE SERPL-MCNC: 103 MG/DL (ref 70–99)
HCT VFR BLD AUTO: 41.5 % (ref 36–48)
HGB BLD-MCNC: 13.9 G/DL (ref 12–16)
MCH RBC QN AUTO: 29.7 PG (ref 26–34)
MCHC RBC AUTO-ENTMCNC: 33.5 G/DL (ref 31–36)
MCV RBC AUTO: 88.7 FL (ref 80–100)
PLATELET # BLD AUTO: 227 K/UL (ref 135–450)
PMV BLD AUTO: 8.3 FL (ref 5–10.5)
POTASSIUM SERPL-SCNC: 4.3 MMOL/L (ref 3.5–5.1)
RBC # BLD AUTO: 4.68 M/UL (ref 4–5.2)
SODIUM SERPL-SCNC: 142 MMOL/L (ref 136–145)
WBC # BLD AUTO: 7.6 K/UL (ref 4–11)

## 2024-04-02 PROCEDURE — 80048 BASIC METABOLIC PNL TOTAL CA: CPT

## 2024-04-02 PROCEDURE — 6370000000 HC RX 637 (ALT 250 FOR IP): Performed by: HOSPITALIST

## 2024-04-02 PROCEDURE — 6360000002 HC RX W HCPCS: Performed by: HOSPITALIST

## 2024-04-02 PROCEDURE — 36415 COLL VENOUS BLD VENIPUNCTURE: CPT

## 2024-04-02 PROCEDURE — 93010 ELECTROCARDIOGRAM REPORT: CPT | Performed by: INTERNAL MEDICINE

## 2024-04-02 PROCEDURE — 6370000000 HC RX 637 (ALT 250 FOR IP): Performed by: INTERNAL MEDICINE

## 2024-04-02 PROCEDURE — 96372 THER/PROPH/DIAG INJ SC/IM: CPT

## 2024-04-02 PROCEDURE — 93005 ELECTROCARDIOGRAM TRACING: CPT | Performed by: INTERNAL MEDICINE

## 2024-04-02 PROCEDURE — 99223 1ST HOSP IP/OBS HIGH 75: CPT | Performed by: INTERNAL MEDICINE

## 2024-04-02 PROCEDURE — 2580000003 HC RX 258: Performed by: HOSPITALIST

## 2024-04-02 PROCEDURE — 85027 COMPLETE CBC AUTOMATED: CPT

## 2024-04-02 PROCEDURE — 1200000000 HC SEMI PRIVATE

## 2024-04-02 RX ORDER — FLECAINIDE ACETATE 50 MG/1
50 TABLET ORAL EVERY 12 HOURS SCHEDULED
Status: DISCONTINUED | OUTPATIENT
Start: 2024-04-02 | End: 2024-04-03 | Stop reason: HOSPADM

## 2024-04-02 RX ORDER — FLECAINIDE ACETATE 100 MG/1
150 TABLET ORAL ONCE
Status: COMPLETED | OUTPATIENT
Start: 2024-04-02 | End: 2024-04-02

## 2024-04-02 RX ORDER — FLECAINIDE ACETATE 100 MG/1
100 TABLET ORAL ONCE
Status: DISCONTINUED | OUTPATIENT
Start: 2024-04-02 | End: 2024-04-02

## 2024-04-02 RX ORDER — FLECAINIDE ACETATE 100 MG/1
200 TABLET ORAL EVERY 12 HOURS SCHEDULED
Status: DISCONTINUED | OUTPATIENT
Start: 2024-04-02 | End: 2024-04-02

## 2024-04-02 RX ADMIN — FLECAINIDE ACETATE 50 MG: 50 TABLET ORAL at 22:03

## 2024-04-02 RX ADMIN — Medication 10 ML: at 22:04

## 2024-04-02 RX ADMIN — METOPROLOL SUCCINATE 50 MG: 50 TABLET, FILM COATED, EXTENDED RELEASE ORAL at 09:18

## 2024-04-02 RX ADMIN — ONDANSETRON 4 MG: 4 TABLET, ORALLY DISINTEGRATING ORAL at 06:27

## 2024-04-02 RX ADMIN — LANSOPRAZOLE 15 MG: 15 TABLET, ORALLY DISINTEGRATING, DELAYED RELEASE ORAL at 06:18

## 2024-04-02 RX ADMIN — ATORVASTATIN CALCIUM 40 MG: 40 TABLET, FILM COATED ORAL at 22:03

## 2024-04-02 RX ADMIN — ACETAMINOPHEN 325MG 650 MG: 325 TABLET ORAL at 06:26

## 2024-04-02 RX ADMIN — ENOXAPARIN SODIUM 30 MG: 100 INJECTION SUBCUTANEOUS at 22:04

## 2024-04-02 RX ADMIN — ASPIRIN 81 MG: 81 TABLET, CHEWABLE ORAL at 09:18

## 2024-04-02 RX ADMIN — ENOXAPARIN SODIUM 30 MG: 100 INJECTION SUBCUTANEOUS at 09:17

## 2024-04-02 RX ADMIN — Medication 10 ML: at 09:19

## 2024-04-02 RX ADMIN — FLECAINIDE ACETATE 150 MG: 100 TABLET ORAL at 10:57

## 2024-04-02 ASSESSMENT — PAIN SCALES - GENERAL
PAINLEVEL_OUTOF10: 0
PAINLEVEL_OUTOF10: 6
PAINLEVEL_OUTOF10: 4
PAINLEVEL_OUTOF10: 0

## 2024-04-02 ASSESSMENT — PAIN DESCRIPTION - DESCRIPTORS: DESCRIPTORS: ACHING

## 2024-04-02 ASSESSMENT — PAIN DESCRIPTION - LOCATION: LOCATION: HEAD

## 2024-04-02 ASSESSMENT — PAIN - FUNCTIONAL ASSESSMENT: PAIN_FUNCTIONAL_ASSESSMENT: ACTIVITIES ARE NOT PREVENTED

## 2024-04-02 NOTE — CARE COORDINATION
Case Management Assessment  Initial Evaluation    Date/Time of Evaluation: 4/2/2024 12:39 PM  Assessment Completed by: Sarah Butler RN    If patient is discharged prior to next notation, then this note serves as note for discharge by case management.    Patient Name: Sherron Wright                   YOB: 1970  Diagnosis: Chest wall pain [R07.89]  New onset atrial fibrillation (HCC) [I48.91]  Goals of care, counseling/discussion [Z71.89]  Atrial fibrillation, unspecified type (HCC) [I48.91]  Atrial fibrillation with rapid ventricular response (HCC) [I48.91]                   Date / Time: 4/1/2024 11:33 AM    Patient Admission Status: Inpatient   Readmission Risk (Low < 19, Mod (19-27), High > 27): Readmission Risk Score: 8.6    Current PCP: Semaj Rivas MD  PCP verified by CM? Yes    Chart Reviewed: Yes      History Provided by: Patient  Patient Orientation: Alert and Oriented    Patient Cognition: Alert    Hospitalization in the last 30 days (Readmission):  No    If yes, Readmission Assessment in  Navigator will be completed.    Advance Directives:      Code Status: Full Code   Patient's Primary Decision Maker is: Legal Next of Kin    Primary Decision Maker: Elieser Tanner - Spouse - [phone]    Discharge Planning:    Patient lives with: Spouse/Significant Other, Children Type of Home: House  Primary Care Giver: Self  Patient Support Systems include: Spouse/Significant Other, Children, Family Members   Current Financial resources: None  Current community resources: None  Current services prior to admission: None            Current DME:              Type of Home Care services:  None    ADLS  Prior functional level: Independent in ADLs/IADLs  Current functional level: Independent in ADLs/IADLs    PT AM-PAC:   /24  OT AM-PAC:   /24    Family can provide assistance at DC: Yes  Would you like Case Management to discuss the discharge plan with any other family members/significant

## 2024-04-02 NOTE — CONSULTS
pruritis  Hematologic/lymphatic: Negative for blood dyscrasia; leukemia/lymphoma  Musculoskeletal: Negative for Connective tissue disease  Neurological:  Negative for Seizure   Behavioral/Psych:Negative for Bipolar disorder, Schizophrenia; Dementia  Endocrine: negative for thyroid, parathyroid disease    No intake or output data in the 24 hours ending 04/02/24 0929    Physical Examination:    BP 98/60 Comment: RN manual recheck  Pulse 76   Temp 97.6 °F (36.4 °C) (Oral)   Resp 18   Ht 1.676 m (5' 6\")   Wt 117.4 kg (258 lb 13.1 oz)   SpO2 93%   BMI 41.77 kg/m²    HEENT:  Face: Atraumatic, Conjunctiva: Pink; non icteric,  Mucous Memb:  Moist, No thyromegaly or Lymphadenopathy  Respiratory:  Resp Assessment: normal, Resp Auscultation: clear   Cardiovascular:  Auscultation: nl S1 & S2, Palpation:  Nl PMI; No heaves or thrills, JVP:  normal  Abdomen: Soft, non-tender, Normal bowel sounds,  No organomegaly  Extremities: No Cyanosis or Clubbing; Edema none  Neurological: Oriented to time, place, and person, Non-anxious  Psychiatric: Normal mood and affect  Skin: Warm and dry,  No rash seen      Current Facility-Administered Medications: sodium chloride flush 0.9 % injection 5-40 mL, 5-40 mL, IntraVENous, 2 times per day  sodium chloride flush 0.9 % injection 5-40 mL, 5-40 mL, IntraVENous, PRN  0.9 % sodium chloride infusion, , IntraVENous, PRN  potassium chloride (KLOR-CON M) extended release tablet 40 mEq, 40 mEq, Oral, PRN **OR** potassium bicarb-citric acid (EFFER-K) effervescent tablet 40 mEq, 40 mEq, Oral, PRN **OR** potassium chloride 10 mEq/100 mL IVPB (Peripheral Line), 10 mEq, IntraVENous, PRN  magnesium sulfate 2000 mg in 50 mL IVPB premix, 2,000 mg, IntraVENous, PRN  enoxaparin Sodium (LOVENOX) injection 30 mg, 30 mg, SubCUTAneous, BID  ondansetron (ZOFRAN-ODT) disintegrating tablet 4 mg, 4 mg, Oral, Q8H PRN **OR** ondansetron (ZOFRAN) injection 4 mg, 4 mg, IntraVENous, Q6H PRN  polyethylene glycol

## 2024-04-03 VITALS
OXYGEN SATURATION: 96 % | WEIGHT: 263.01 LBS | HEART RATE: 78 BPM | TEMPERATURE: 97.7 F | HEIGHT: 66 IN | RESPIRATION RATE: 18 BRPM | DIASTOLIC BLOOD PRESSURE: 70 MMHG | BODY MASS INDEX: 42.27 KG/M2 | SYSTOLIC BLOOD PRESSURE: 125 MMHG

## 2024-04-03 PROCEDURE — 6370000000 HC RX 637 (ALT 250 FOR IP): Performed by: HOSPITALIST

## 2024-04-03 PROCEDURE — 2580000003 HC RX 258: Performed by: HOSPITALIST

## 2024-04-03 PROCEDURE — 99233 SBSQ HOSP IP/OBS HIGH 50: CPT | Performed by: INTERNAL MEDICINE

## 2024-04-03 PROCEDURE — 6360000002 HC RX W HCPCS: Performed by: HOSPITALIST

## 2024-04-03 PROCEDURE — 6370000000 HC RX 637 (ALT 250 FOR IP): Performed by: INTERNAL MEDICINE

## 2024-04-03 RX ORDER — FLECAINIDE ACETATE 50 MG/1
50 TABLET ORAL EVERY 12 HOURS SCHEDULED
Qty: 60 TABLET | Refills: 3 | Status: SHIPPED | OUTPATIENT
Start: 2024-04-03

## 2024-04-03 RX ADMIN — LANSOPRAZOLE 15 MG: 15 TABLET, ORALLY DISINTEGRATING, DELAYED RELEASE ORAL at 06:22

## 2024-04-03 RX ADMIN — FLECAINIDE ACETATE 50 MG: 50 TABLET ORAL at 09:19

## 2024-04-03 RX ADMIN — ACETAMINOPHEN 325MG 650 MG: 325 TABLET ORAL at 09:19

## 2024-04-03 RX ADMIN — ASPIRIN 81 MG: 81 TABLET, CHEWABLE ORAL at 09:19

## 2024-04-03 RX ADMIN — METOPROLOL SUCCINATE 50 MG: 50 TABLET, FILM COATED, EXTENDED RELEASE ORAL at 09:19

## 2024-04-03 RX ADMIN — ENOXAPARIN SODIUM 30 MG: 100 INJECTION SUBCUTANEOUS at 09:19

## 2024-04-03 RX ADMIN — ONDANSETRON 4 MG: 4 TABLET, ORALLY DISINTEGRATING ORAL at 09:19

## 2024-04-03 RX ADMIN — Medication 10 ML: at 09:19

## 2024-04-03 ASSESSMENT — PAIN DESCRIPTION - ONSET: ONSET: ON-GOING

## 2024-04-03 ASSESSMENT — PAIN DESCRIPTION - LOCATION: LOCATION: HEAD

## 2024-04-03 ASSESSMENT — PAIN DESCRIPTION - DESCRIPTORS: DESCRIPTORS: ACHING;OTHER (COMMENT)

## 2024-04-03 ASSESSMENT — PAIN - FUNCTIONAL ASSESSMENT: PAIN_FUNCTIONAL_ASSESSMENT: ACTIVITIES ARE NOT PREVENTED

## 2024-04-03 ASSESSMENT — PAIN SCALES - GENERAL
PAINLEVEL_OUTOF10: 4
PAINLEVEL_OUTOF10: 0

## 2024-04-03 ASSESSMENT — PAIN DESCRIPTION - FREQUENCY: FREQUENCY: CONTINUOUS

## 2024-04-03 ASSESSMENT — PAIN DESCRIPTION - ORIENTATION: ORIENTATION: OTHER (COMMENT)

## 2024-04-03 ASSESSMENT — PAIN DESCRIPTION - PAIN TYPE: TYPE: ACUTE PAIN

## 2024-04-03 NOTE — PLAN OF CARE
Problem: Discharge Planning  Goal: Discharge to home or other facility with appropriate resources  Outcome: Progressing     Problem: Pain  Goal: Verbalizes/displays adequate comfort level or baseline comfort level  Outcome: Progressing     Problem: Cardiovascular - Adult  Goal: Absence of cardiac dysrhythmias or at baseline  Outcome: Progressing     Problem: Metabolic/Fluid and Electrolytes - Adult  Goal: Electrolytes maintained within normal limits  Outcome: Progressing     Problem: Metabolic/Fluid and Electrolytes - Adult  Goal: Hemodynamic stability and optimal renal function maintained  Outcome: Progressing

## 2024-04-03 NOTE — DISCHARGE SUMMARY
V2.0  Discharge Summary    Name:  Sherron Wright /Age/Sex: 1970 (53 y.o. female)   Admit Date: 2024  Discharge Date: 4/3/24    MRN & CSN:  [id number] & [id number] Encounter Date and Time 4/3/24 5:24 PM EDT    Attending:  No att. providers found Discharging Provider: Aime Dale MD       Hospital Course:     Brief HPI: Sherron Wright is a 53 y.o. female who presented with palpitations found to be in A-fib with RVR    Brief Problem Based Course:   New diagnosis atrial fibrillation with rapid ventricular response: Patient presented to the hospital after feeling her heart was running fast after waking up.  Her watch showed A-fib with heart rate in the 140s to 150s.  Patient tried to wait it out but heart rate was persistently elevated.  Rechecked her cardiologist who directed her to the ED.  Patient was given IV Dilt bolus in ED with improvement of her heart rate but heart rate increased again when she went up to use the restroom.  She was also given a dose of IV metoprolol. Cardiology was consulted and patient was admitted.  Patient remained in A-fib but heart rate was under better control. Patient was loaded with flecainide and converted into sinus rhythm.  Patient was monitored an additional night without returning into A-fib.  She was discharged with planned cardiology follow-up for consideration of outpatient ablation.  Decision was made against oral anticoagulation due to low [id number] score      The patient expressed appropriate understanding of, and agreement with the discharge recommendations, medications, and plan.     Consults this admission:  IP CONSULT TO HOSPITALIST  IP CONSULT TO CARDIOLOGY    Discharge Diagnosis:   New onset atrial fibrillation (HCC)      Discharge Instruction:   Follow up appointments:   Primary care physician: Semaj Rivas MD within 2 weeks  Diet:  Low sodium diet    Activity: activity as tolerated  Disposition: Discharged to:   [x]Home, []C, []SNF,

## 2024-04-03 NOTE — PROGRESS NOTES
4 Eyes Skin Assessment     NAME:  Sherron Wright  YOB: 1970  MEDICAL RECORD NUMBER:  [id number]    The patient is being assessed for  Admission    I agree that at least one RN has performed a thorough Head to Toe Skin Assessment on the patient. ALL assessment sites listed below have been assessed.      Areas assessed by both nurses:    Head, Face, Ears, Shoulders, Back, Chest, Arms, Elbows, Hands, Sacrum. Buttock, Coccyx, Ischium, Legs. Feet and Heels, and Under Medical Devices         Does the Patient have a Wound? No noted wound(s)       Ramírez Prevention initiated by RN: No  Wound Care Orders initiated by RN: No    Pressure Injury (Stage 3,4, Unstageable, DTI, NWPT, and Complex wounds) if present, place Wound referral order by RN under : No    New Ostomies, if present place, Ostomy referral order under : No     Nurse 1 eSignature: Electronically signed by Ernesto Ordonez RN on 4/2/24 at 12:12 AM EDT    **SHARE this note so that the co-signing nurse can place an eSignature**    Nurse 2 eSignature: Electronically signed by Chrissy Lee RN on 4/2/24 at 12:13 AM EDT   
Called [facility] and spoke to Kapil informed her that pt is on lovenox 30mg sq BID, concerned that isn't a treatment dose. Also informed her that pt's blood pressure is on the lower end, 90s systolic, heart rate 110-120 and higher with activity. Pt does feel the palpitations with the higher heart rate. Kapil said she would let Dr. Arvizu know.   
Discharge instructions reviewed with patient. Reviewed follow up appointments and next steps in regards to her heart care. Reviewed medications including time of next dose and purpose of medications and potential side effects. Pt verbalized understanding. Pt's  at bedside to take her home. Pt chose to ambulate to private vehicle.   
Dr. Edwardsq in to see pt. New orders noted. He did discuss anticoagulation with pt.  
EKG completed, pt is in SR.  
Medication Reconciliation    List of medications patient is currently taking is complete.     Source of information: 1. Conversation with patient at bedside                                      2. EPIC records      Allergies  Patient has no known allergies.     Notes regarding home medications:   1. Patient takes Torsemide 10 mg po three times weekly on MON/WED/FRI.  2. Patient takes Spironolactone 25 mg po four times weekly on TUE/THUR/SAT/SUN.  3. Patient has received her Torsemide earlier this morning - she has not taken any other home medications yet today.  4. Patient was taken off of Lisinopril + ASA per Dr. Arvizu - removed from medication list.    Mike Costello [facility], PharmD, BCPS  4/1/2024 12:37 PM                
Pt arrived via stretcher from ED to room 5268.  Heart monitor connected and verified with [facility]. VS, assessment, and admission complete. 4 eyes assessment complete. Pt oriented to unit and room. Call light and bedside table in reach. All questions answered. Pt resting quietly in bed with no complaints or voiced needs at this time.     Electronically signed by Ernesto Ordonez RN on 4/2/2024 at 12:14 AM   
Pt given first dose of flecainide.  
Pt is currently in SR rate 74. Called [facility] and spoke to Ramos and informed her questioned if Dr. Arvizu would like that additional dose of flecainide 100mg given. Dr. Arvizu said to hold off that additional 100mg.  
This RN has spent the majority of shift explaining pain medications and hospital protocol to patient. I have explained PRN medications and procedures for PRN pain medication. Patient is alert and oriented, awake, and breathing normally. Patients main complaint at current time is that the hospital is not following her wishes for pain medication. Patient is requesting to leave AMA at this time.     Electronically signed by Ernesto Ordonez RN on 4/1/2024 at 11:20 PM   
  Gastrointestinal: Negative for abd.pain; constipation/diarrhea;    Genitourinary: Negative for stones; hematuria; frequency hesitancy  Integumentt: Negative for rash or pruritis  Hematologic/lymphatic: Negative for blood dyscrasia; leukemia/lymphoma  Musculoskeletal: Negative for Connective tissue disease  Neurological:  Negative for Seizure   Behavioral/Psych:Negative for Bipolar disorder, Schizophrenia; Dementia  Endocrine: negative for thyroid, parathyroid disease      Intake/Output Summary (Last 24 hours) at 4/3/2024 0930  Last data filed at 4/3/2024 0926  Gross per 24 hour   Intake 800 ml   Output 301 ml   Net 499 ml       Physical Examination:    /70   Pulse 79   Temp 97.7 °F (36.5 °C) (Oral)   Resp 18   Ht 1.676 m (5' 6\")   Wt 119.3 kg (263 lb 0.1 oz)   SpO2 96%   BMI 42.45 kg/m²    HEENT:  Face: Atraumatic, Conjunctiva: Pink; non icteric,  Mucous Memb:  Moist, No thyromegaly or Lymphadenopathy  Respiratory:  Resp Assessment: normal, Resp Auscultation: clear   Cardiovascular:  Auscultation: nl S1 & S2, Palpation:  Nl PMI; No heaves or thrills, JVP:  normal  Abdomen: Soft, non-tender, Normal bowel sounds,  No organomegaly  Extremities: No Cyanosis or Clubbing; Edema none  Neurological: Oriented to time, place, and person, Non-anxious  Psychiatric: Normal mood and affect  Skin: Warm and dry,  No rash seen      Current Facility-Administered Medications: flecainide (TAMBOCOR) tablet 50 mg, 50 mg, Oral, 2 times per day  sodium chloride flush 0.9 % injection 5-40 mL, 5-40 mL, IntraVENous, 2 times per day  sodium chloride flush 0.9 % injection 5-40 mL, 5-40 mL, IntraVENous, PRN  0.9 % sodium chloride infusion, , IntraVENous, PRN  potassium chloride (KLOR-CON M) extended release tablet 40 mEq, 40 mEq, Oral, PRN **OR** potassium bicarb-citric acid (EFFER-K) effervescent tablet 40 mEq, 40 mEq, Oral, PRN **OR** potassium chloride 10 mEq/100 mL IVPB (Peripheral Line), 10 mEq, IntraVENous, PRN  magnesium 
Calculation (Bazett) 411 ms    R Axis 19 degrees    T Axis 30 degrees    Diagnosis       Atrial fibrillationAbnormal ECGWhen compared with ECG of 01-APR-2024 11:32, (unconfirmed)Vent. rate has decreased BY  71 BPMST no longer depressed in Inferior leads   Troponin    Collection Time: 04/01/24 12:57 PM   Result Value Ref Range    Troponin, High Sensitivity 10 0 - 14 ng/L   Troponin    Collection Time: 04/01/24  8:46 PM   Result Value Ref Range    Troponin, High Sensitivity 9 0 - 14 ng/L   TSH    Collection Time: 04/01/24  8:46 PM   Result Value Ref Range    TSH 1.68 0.27 - 4.20 uIU/mL   CBC    Collection Time: 04/02/24  5:42 AM   Result Value Ref Range    WBC 7.6 4.0 - 11.0 K/uL    RBC 4.68 4.00 - 5.20 M/uL    Hemoglobin 13.9 12.0 - 16.0 g/dL    Hematocrit 41.5 36.0 - 48.0 %    MCV 88.7 80.0 - 100.0 fL    MCH 29.7 26.0 - 34.0 pg    MCHC 33.5 31.0 - 36.0 g/dL    RDW 14.7 12.4 - 15.4 %    Platelets 227 135 - 450 K/uL    MPV 8.3 5.0 - 10.5 fL   Basic Metabolic Panel    Collection Time: 04/02/24  5:42 AM   Result Value Ref Range    Sodium 142 136 - 145 mmol/L    Potassium 4.3 3.5 - 5.1 mmol/L    Chloride 106 99 - 110 mmol/L    CO2 26 21 - 32 mmol/L    Anion Gap 10 3 - 16    Glucose 103 (H) 70 - 99 mg/dL    BUN 15 7 - 20 mg/dL    Creatinine 0.6 0.6 - 1.1 mg/dL    Est, Glom Filt Rate >90 >60    Calcium 9.0 8.3 - 10.6 mg/dL        Imaging/Diagnostics Last 24 Hours   XR CHEST PORTABLE    Result Date: 4/1/2024  EXAMINATION: ONE XRAY VIEW OF THE CHEST 4/1/2024 11:46 am COMPARISON: 02/09/2024 HISTORY: ORDERING SYSTEM PROVIDED HISTORY: chest pain TECHNOLOGIST PROVIDED HISTORY: Reason for exam:->chest pain Reason for Exam: chest pain FINDINGS: The lungs are without acute focal process.  There is no effusion or pneumothorax. The cardiomediastinal silhouette is stable. The osseous structures are stable.     No acute process.       Electronically signed by Aime Dale MD on 4/2/2024 at 8:54 AM

## 2024-04-03 NOTE — PLAN OF CARE
Patient admitted for new onset atrial fib. Pt received medication to control heart rate and pt converted to SR. Pt remains in SR. No complaints of palpitations. Safety maintained throughout stay. Pt was medicated for complaints of headache pain and received relief from tylenol. Pt is being discharged today to home.

## 2024-04-04 ENCOUNTER — TELEPHONE (OUTPATIENT)
Dept: CARDIOLOGY CLINIC | Age: 54
End: 2024-04-04

## 2024-04-04 NOTE — TELEPHONE ENCOUNTER
Sherron is scheduled on 4/19 at 8:20A with Nolberto.    Sherron is also aware of f/u appt with Silvina 5/22 at 3p. She agreed to time/date.

## 2024-04-04 NOTE — TELEPHONE ENCOUNTER
Sherron needs to schedule a follow up w/ Dr. Arvizu as advised (2 weeks for hospital follow up), as well as with EP.  Neither provider has anything available.     Please Assist: [phone]

## 2024-04-04 NOTE — TELEPHONE ENCOUNTER
Per text conversation with Dr. Arvizu patient is to be schedule the week of the 15th and double book for early morning that week.  Please let me know when the appointment will be.

## 2024-04-17 NOTE — PROGRESS NOTES
[facility]  Cardiology Note      Sherron Wright  1970, 53 y.o.        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  She has had elevated proBNP as well.  Workup, included an echocardiogram,  showed normal LV function; a stress test  was negative for ischemia at 6 minutes of exercise.  She was treated diastolic heart failure with torsemide and spironolactone 3 days a week with improvement in her symptoms.  Recently she was admitted to the hospital for palpitations & was found to have new onset atrial fibrillation. This was treated with \"a pill in the pocket\" flecainide with successful conversion to SR.    Presently on flecainide 50 twice daily, atorvastatin Toprol-XL torsemide 10 mg 3 days a week spironolactone.    Sherron remains in sinus rhythm on the flecainide today.  However she still feels weird extremely fatigued.  She also complains about a strange feeling in her neck whenever she is trying to do some walking.    Past Medical History:   Diagnosis Date    Diastolic heart failure (HCC)       Past Surgical History:   Procedure Laterality Date    HYSTERECTOMY (CERVIX STATUS UNKNOWN)        Family History   Problem Relation Age of Onset    Heart Attack Father     Heart Disease Father     Heart Disease Brother       Social History     Tobacco Use    Smoking status: Never    Smokeless tobacco: Never   Vaping Use    Vaping Use: Never used   Substance Use Topics    Alcohol use: Yes     Alcohol/week: 3.0 standard drinks of alcohol     Types: 3 Glasses of wine per week     Comment: social    Drug use: Never     No Known Allergies      Review of Systems -   Constitutional: Negative for weight gain/loss; malaise, fever  Respiratory: Negative for Asthma;  cough and hemoptysis  Cardiovascular: Negative for

## 2024-04-19 ENCOUNTER — OFFICE VISIT (OUTPATIENT)
Dept: CARDIOLOGY CLINIC | Age: 54
End: 2024-04-19

## 2024-04-19 VITALS
BODY MASS INDEX: 42.59 KG/M2 | SYSTOLIC BLOOD PRESSURE: 124 MMHG | OXYGEN SATURATION: 96 % | DIASTOLIC BLOOD PRESSURE: 84 MMHG | WEIGHT: 265 LBS | HEIGHT: 66 IN

## 2024-04-19 DIAGNOSIS — I49.9 IRREGULAR HEART RATE: Primary | ICD-10-CM

## 2024-04-19 PROCEDURE — 99213 OFFICE O/P EST LOW 20 MIN: CPT | Performed by: INTERNAL MEDICINE

## 2024-04-19 PROCEDURE — 93000 ELECTROCARDIOGRAM COMPLETE: CPT | Performed by: INTERNAL MEDICINE

## 2024-05-21 NOTE — PROGRESS NOTES
limited to, the risks of bleeding, infection, radiation exposure, injury to vascular, cardiac and surrounding structures (including pneumothorax), stroke, cardiac perforation, tamponade, need for emergent open heart surgery, need for pacemaker implantation, Injury to the phrenic nerve, injury to the esophagus, myocardial infarction and death were discussed in detail.   - I explained the success rate considering possible need for a second procedure is about 60-80% and with addition of anti arrhythmics is higher     Diastolic dysfunction.  - Continue current medical management.   - Euvolemic on today's exam.  - Following with Dallin Arvizu MD for management.      Hypotension  - BP today 94/78.  - Symptomatic with lightheadedness.  Start on Midodrine 5 mg TID.  Patient will be started on new medication Midodrine.  Patient verbalizes understanding of the need for treatment and education provided at today's visit. Additional education materials will be provided in the AVS.       Follow ups:  Follow up in 3 months.  Continue routine follow up with Dallin Arvizu MD.    Thank you for allowing me to participate in the care of Sherron Wright. All questions and concerns were addressed to the patient/family. Alternatives to my treatment were discussed.     This note was scribed in the presence of Bony Cool MD by Andreea Joyner RN.    Physician attestation: The scribe's documentation has been prepared under my direction and has been personally reviewed by me in its entirety. I confirm that the note above reflects all work, treatment, procedures, and medical decision making performed by me.     Bony Cool MD  Cardiac Electrophysiology  [facility]

## 2024-05-22 ENCOUNTER — OFFICE VISIT (OUTPATIENT)
Dept: CARDIOLOGY CLINIC | Age: 54
End: 2024-05-22

## 2024-05-22 VITALS
SYSTOLIC BLOOD PRESSURE: 94 MMHG | BODY MASS INDEX: 44.22 KG/M2 | HEIGHT: 65 IN | DIASTOLIC BLOOD PRESSURE: 78 MMHG | OXYGEN SATURATION: 96 % | HEART RATE: 76 BPM | WEIGHT: 265.4 LBS

## 2024-05-22 DIAGNOSIS — I50.30 DIASTOLIC HEART FAILURE, UNSPECIFIED HF CHRONICITY (HCC): ICD-10-CM

## 2024-05-22 DIAGNOSIS — I48.0 PAF (PAROXYSMAL ATRIAL FIBRILLATION) (HCC): Primary | ICD-10-CM

## 2024-05-22 RX ORDER — MIDODRINE HYDROCHLORIDE 5 MG/1
5 TABLET ORAL 3 TIMES DAILY
Qty: 90 TABLET | Refills: 3 | Status: SHIPPED | OUTPATIENT
Start: 2024-05-22

## 2024-05-22 RX ORDER — METOPROLOL SUCCINATE 25 MG/1
12.5 TABLET, EXTENDED RELEASE ORAL DAILY
Qty: 45 TABLET | Refills: 0 | Status: SHIPPED | OUTPATIENT
Start: 2024-05-22

## 2024-05-22 NOTE — PATIENT INSTRUCTIONS
If you have any question regarding your ablation or would like to proceed with scheduling the ablation please contact Dr. Cool's Nurse at [phone].    The procedure will take place at [facility]  [address], [place] [address]    Atrial fibrillation Ablation Pre procedure Instructions    Date:______________________________    Arrive at:__________________________    Procedure time:____________________      Pre-Procedure Instructions   You will need to fast (nothing to eat or drink) after midnight the day of your procedure.  Do NOT chew gum or eat mints the day of your procedure  You will need to hold your Flecainide for 7 days prior to the procedure.  Do NOT any medications the morning of the procedure.  You will need to complete pre procedure lab work 3-5 days prior to the procedure.  Do not use any lotions, creams or perfume the morning of procedure.   Please have a responsible adult to drive you home after procedure, you should go home same day, but there is always a possibility of an overnight stay.  Cath lab will provide you with all post procedure instructions  Follow up one month with Dr. Cool after the procedure and three months with his nurse practitioner.                                        [facility]     [facility]  [address]  [address]   [place] [address]  Phone: [phone]  The hours are Mon -Fri.  6:30 am - 4:00 pm   Saturday 8:00 am - noon  No appointment necessary

## 2024-08-02 RX ORDER — TORSEMIDE 10 MG/1
10 TABLET ORAL DAILY
Qty: 30 TABLET | Refills: 1 | Status: SHIPPED | OUTPATIENT
Start: 2024-08-02

## 2024-08-02 RX ORDER — SPIRONOLACTONE 25 MG/1
25 TABLET ORAL DAILY
Qty: 30 TABLET | Refills: 1 | Status: SHIPPED | OUTPATIENT
Start: 2024-08-02 | End: 2025-08-02

## 2024-08-02 RX ORDER — FLECAINIDE ACETATE 50 MG/1
50 TABLET ORAL EVERY 12 HOURS SCHEDULED
Qty: 180 TABLET | Refills: 3 | Status: SHIPPED | OUTPATIENT
Start: 2024-08-02

## 2024-08-02 NOTE — TELEPHONE ENCOUNTER
Last OV: 04/19/2024  Next OV: 08/23/2024  Most recent Labs: 04/02/2024 bmp  04/01/2024 tsh   Last EKG (if needed): 05/22/2024

## 2024-08-02 NOTE — TELEPHONE ENCOUNTER
Dr. Arvizu, please review the dosing of torsemide and spironolactone. Please clarify if patient taking alternate of each other is correct. Enough medication was sent to last until this is sorted out.    From your note 4/19/24  Presently on flecainide 50 twice daily, atorvastatin Toprol-XL torsemide 10 mg 3 days a week spironolactone.   She clearly has a documented proBNP of 4000 with a normal systolic function the first time it dropped to 154 and then gone up to 400. The 10 mg of torsemide 2 to 3 days a week may just be not enough for. I have asked her to take it 5 days a week along with spironolactone and see if the symptoms improve.       Please advise.

## 2024-08-02 NOTE — TELEPHONE ENCOUNTER
Medication Refill    When was your last appointment with cardiology?  5/22/24  (If 1 yr or longer, please schedule appointment)    (If patient has been told they do not need to follow-up - medications should be filled by PCP)    When did you last have labs drawn?  4/2/24    Medication needing refilled? flecainide (TAMBOCOR)     Dosage of the medication? 50 MG tablet     How are you taking this medication (QD, BID, TID, QID, PRN)?  Take 1 tablet by mouth every 12 hours     Do you want a 30 or 90 day supply? 90    When will you run out of your medication? now    Which Pharmacy are we sending this medication to? [id number] [address] -     Pharmacy Phone: [phone]   Pharmacy Fax:[phone]       Medication needing refilled? spironolactone (ALDACTONE)     Dosage of the medication? 25 MG tablet     How are you taking this medication (QD, BID, TID, QID, PRN)? Take 1 tablet by mouth daily Patient reports taking torsemide 10mg on  M,W,F AND Spironolactone 25mg on T,TH.     Do you want a 30 or 90 day supply? 90      Medication needing refilled? torsemide (DEMADEX)     Dosage of the medication? 10 MG tablet     How are you taking this medication (QD, BID, TID, QID, PRN)? Take 1 tablet by mouth daily Patient reports taking torsemide 10mg on  M,W,F AND Spironolactone 25mg on T,TH     Do you want a 30 or 90 day supply? 90    When will you run out of your medication? now

## 2024-11-13 RX ORDER — METOPROLOL SUCCINATE 25 MG/1
12.5 TABLET, EXTENDED RELEASE ORAL DAILY
Qty: 15 TABLET | Refills: 0 | Status: SHIPPED | OUTPATIENT
Start: 2024-05-22

## 2024-11-13 NOTE — TELEPHONE ENCOUNTER
Patient needs a follow up appointment with both Dr. Arvizu and Dr. Cool. Please schedule. Sending refill for 1 month.   Dosage had been changed by Silvina in May 2024, this new dose was re-ordered.

## 2025-01-10 RX ORDER — METOPROLOL SUCCINATE 25 MG/1
12.5 TABLET, EXTENDED RELEASE ORAL DAILY
Qty: 15 TABLET | Refills: 0 | Status: SHIPPED | OUTPATIENT
Start: 2025-01-10

## 2025-01-10 RX ORDER — SPIRONOLACTONE 25 MG/1
25 TABLET ORAL DAILY
Qty: 30 TABLET | Refills: 0 | Status: SHIPPED | OUTPATIENT
Start: 2025-01-10 | End: 2026-01-10

## 2025-01-10 NOTE — TELEPHONE ENCOUNTER
Last OV: 05/22/24  Next OV: 01/15/25  Last refill: Spironolactone 08/02/24, Metoprolol 05/22/24  Most recent Labs: BMP 04/02/24  Last EKG (if needed):05/22/24

## 2025-01-14 NOTE — PROGRESS NOTES
[facility]  Cardiology Note      Sherron Wright  1970, 54 y.o.      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  She was found to have elevated proBNP.  Echo shows normal LV function.  A stress nuclear scan was negative for ischemia at 6 METS of exercise.  She has been treated for diastolic heart failure with torsemide and spironolactone 3 days a week with improvement in her symptoms.    Subsequently she was admitted for palpitations and found to have new onset A-fib.  She was treated with \"pill in the pocket\" flecainide with successful conversion.  She is presently on flecainide 50 twice daily along with Toprol-XL torsemide atorvastatin and spironolactone    Patient is here today for a 3 month follow up for Afib.  Doing well.  Had 1 episode of heart going at a rate of 120.  May have missed her flecainide the night before        Past Medical History:   Diagnosis Date    Diastolic heart failure (HCC)       Past Surgical History:   Procedure Laterality Date    HYSTERECTOMY (CERVIX STATUS UNKNOWN)        Family History   Problem Relation Age of Onset    Heart Attack Father     Heart Disease Father     Heart Disease Brother       Social History     Tobacco Use    Smoking status: Never    Smokeless tobacco: Never   Vaping Use    Vaping status: Never Used   Substance Use Topics    Alcohol use: Yes     Alcohol/week: 3.0 standard drinks of alcohol     Types: 3 Glasses of wine per week     Comment: social    Drug use: Never     No Known Allergies      Review of Systems -   Constitutional: Negative for weight gain/loss; malaise, fever  Respiratory: Negative for Asthma;  cough and hemoptysis  Cardiovascular: Negative for palpitations,dizziness   Gastrointestinal:

## 2025-01-15 ENCOUNTER — OFFICE VISIT (OUTPATIENT)
Dept: CARDIOLOGY CLINIC | Age: 55
End: 2025-01-15
Payer: COMMERCIAL

## 2025-01-15 VITALS
WEIGHT: 272.2 LBS | DIASTOLIC BLOOD PRESSURE: 78 MMHG | OXYGEN SATURATION: 97 % | BODY MASS INDEX: 45.35 KG/M2 | HEART RATE: 73 BPM | HEIGHT: 65 IN | SYSTOLIC BLOOD PRESSURE: 108 MMHG

## 2025-01-15 DIAGNOSIS — I50.32 CHRONIC DIASTOLIC HEART FAILURE (HCC): ICD-10-CM

## 2025-01-15 DIAGNOSIS — I48.91 NEW ONSET ATRIAL FIBRILLATION (HCC): Primary | ICD-10-CM

## 2025-01-15 DIAGNOSIS — I48.91 NEW ONSET ATRIAL FIBRILLATION (HCC): ICD-10-CM

## 2025-01-15 LAB
ALBUMIN SERPL-MCNC: 4.5 G/DL (ref 3.4–5)
ALBUMIN/GLOB SERPL: 2 {RATIO} (ref 1.1–2.2)
ALP SERPL-CCNC: 98 U/L (ref 40–129)
ALT SERPL-CCNC: 23 U/L (ref 10–40)
ANION GAP SERPL CALCULATED.3IONS-SCNC: 11 MMOL/L (ref 3–16)
AST SERPL-CCNC: 24 U/L (ref 15–37)
BILIRUB SERPL-MCNC: <0.2 MG/DL (ref 0–1)
BUN SERPL-MCNC: 15 MG/DL (ref 7–20)
CALCIUM SERPL-MCNC: 9.5 MG/DL (ref 8.3–10.6)
CHLORIDE SERPL-SCNC: 101 MMOL/L (ref 99–110)
CO2 SERPL-SCNC: 28 MMOL/L (ref 21–32)
CREAT SERPL-MCNC: 0.8 MG/DL (ref 0.6–1.1)
GFR SERPLBLD CREATININE-BSD FMLA CKD-EPI: 87 ML/MIN/{1.73_M2}
GLUCOSE SERPL-MCNC: 96 MG/DL (ref 70–99)
NT-PROBNP SERPL-MCNC: 56 PG/ML (ref 0–124)
POTASSIUM SERPL-SCNC: 4.4 MMOL/L (ref 3.5–5.1)
PROT SERPL-MCNC: 6.8 G/DL (ref 6.4–8.2)
SODIUM SERPL-SCNC: 140 MMOL/L (ref 136–145)

## 2025-01-15 PROCEDURE — 93000 ELECTROCARDIOGRAM COMPLETE: CPT | Performed by: INTERNAL MEDICINE

## 2025-01-15 PROCEDURE — 99214 OFFICE O/P EST MOD 30 MIN: CPT | Performed by: INTERNAL MEDICINE

## 2025-01-15 RX ORDER — METOPROLOL SUCCINATE 25 MG/1
50 TABLET, EXTENDED RELEASE ORAL DAILY
Qty: 90 TABLET | Refills: 5 | Status: SHIPPED | OUTPATIENT
Start: 2025-01-15

## 2025-01-15 RX ORDER — ATORVASTATIN CALCIUM 40 MG/1
40 TABLET, FILM COATED ORAL NIGHTLY
Qty: 90 TABLET | Refills: 5 | Status: SHIPPED | OUTPATIENT
Start: 2025-01-15

## 2025-01-15 RX ORDER — MIDODRINE HYDROCHLORIDE 5 MG/1
5 TABLET ORAL 3 TIMES DAILY
Qty: 90 TABLET | Refills: 5 | Status: SHIPPED | OUTPATIENT
Start: 2025-01-15

## 2025-01-15 RX ORDER — POTASSIUM CHLORIDE 1500 MG/1
20 TABLET, EXTENDED RELEASE ORAL DAILY
Qty: 90 TABLET | Refills: 5 | Status: SHIPPED | OUTPATIENT
Start: 2025-01-15

## 2025-01-15 RX ORDER — FLECAINIDE ACETATE 50 MG/1
50 TABLET ORAL EVERY 12 HOURS SCHEDULED
Qty: 180 TABLET | Refills: 5 | Status: SHIPPED | OUTPATIENT
Start: 2025-01-15

## 2025-01-27 ENCOUNTER — TELEPHONE (OUTPATIENT)
Dept: CARDIOLOGY CLINIC | Age: 55
End: 2025-01-27

## 2025-01-27 RX ORDER — TORSEMIDE 10 MG/1
10 TABLET ORAL DAILY
Qty: 30 TABLET | Refills: 3 | Status: SHIPPED | OUTPATIENT
Start: 2025-01-27

## 2025-01-27 NOTE — TELEPHONE ENCOUNTER
Medication Question/Concern    What is the name of the medication you need to speak with someone about?  torsemide (DEMADEX)     Was this prescribed by your cardiologist?   Yes    Dosage of the medication:  10 MG tablet     How are you taking this medication (QD, BID, TID, QID, PRN):  Take 1 tablet by mouth daily 10mg on  M,W,F alternate with Spironolactone 25mg on T,TH   What issues/concerns are you having with this medication?  Instructions unclear on how to take Torsemide.  Transferred call to LESTER Jeong

## 2025-01-27 NOTE — TELEPHONE ENCOUNTER
Clarified RX with pharmacist  Torsemide M-W- F  Spirolactone was d/cd on 1/15/25 at OV with Nolberto. Started potassium instead. Pharmacist agrees

## 2025-04-11 NOTE — PROGRESS NOTES
Cardiac Electrophysiology Consultation   Date: 4/18/2025   Reason for Consultation: Atrial Fibrillation   Consult Requesting Physician: Mac Arvizu MD  Primary Care Physician: Hero Jasso MD     Chief Complaint:   Chief Complaint   Patient presents with    3 Month Follow-Up    Shortness of Breath    Dizziness    Palpitations      HPI: Sherron Wright is a 54 y.o. patient with a history of paroxysmal atrial fibrillation.  She presented to the [facility] ED on 04/01/2024  reporting symptoms of chest pain / pressure, wet cough and diaphoresis, which woke her from her sleep. She checked her heart rhythm using the [employer] Mobile device and reported her heart rate was running between 150-160 bpm. EKG showed atrial fibrillation with rapid ventricular response.  Her heart rate came down to low 100s with 1 dose of Cardizem 10 mg IV. Chest x-ray was negative.  Her BNP was mildly elevated at 42.  Troponin and repeat troponin were negative.  COVID and influenza were negative. She was admitted to the hospital for new onset atrial fibrillation.  She was converted to normal sinus rhythm with bolus dose of Flecainide. She was discharged home on Flecainide 50 mg BID and Toprol XL 50 mg daily.    Seen in clinic as a new patient 05/22/2024 and reported symptoms of dyspnea on exertion and pressure in her neck. Reports when she was in atrial fibrillation at first she just felt weird then after about a day when heart rate was elevated in the 160's she was symptomatic with palpitations. She endorses orthostatic hypotension has cut her Toprol XL to 12.5 mg reports she is feeling terrible with lightheadedness.  She reports the in the fall an in January of 2024 she had similar symptom to the one she had when first diagnosed with atrial fibrillation. Discussed with the patient if she is to have breakthrough episodes, would change AAD or proceed with ablation.     Today, she presents to office for evaluation of atrial fibrillation.

## 2025-04-18 ENCOUNTER — TELEPHONE (OUTPATIENT)
Dept: CARDIOLOGY CLINIC | Age: 55
End: 2025-04-18

## 2025-04-18 ENCOUNTER — OFFICE VISIT (OUTPATIENT)
Dept: CARDIOLOGY CLINIC | Age: 55
End: 2025-04-18
Payer: COMMERCIAL

## 2025-04-18 VITALS
HEIGHT: 65 IN | WEIGHT: 276 LBS | OXYGEN SATURATION: 96 % | BODY MASS INDEX: 45.98 KG/M2 | DIASTOLIC BLOOD PRESSURE: 84 MMHG | SYSTOLIC BLOOD PRESSURE: 122 MMHG | HEART RATE: 75 BPM

## 2025-04-18 DIAGNOSIS — I48.19 PERSISTENT ATRIAL FIBRILLATION (HCC): ICD-10-CM

## 2025-04-18 DIAGNOSIS — I48.91 NEW ONSET ATRIAL FIBRILLATION (HCC): Primary | ICD-10-CM

## 2025-04-18 DIAGNOSIS — I48.0 PAF (PAROXYSMAL ATRIAL FIBRILLATION) (HCC): ICD-10-CM

## 2025-04-18 DIAGNOSIS — I50.32 CHRONIC DIASTOLIC HEART FAILURE (HCC): ICD-10-CM

## 2025-04-18 DIAGNOSIS — Z01.818 PREOP EXAMINATION: ICD-10-CM

## 2025-04-18 PROCEDURE — 99215 OFFICE O/P EST HI 40 MIN: CPT | Performed by: INTERNAL MEDICINE

## 2025-04-18 PROCEDURE — 93000 ELECTROCARDIOGRAM COMPLETE: CPT | Performed by: INTERNAL MEDICINE

## 2025-04-18 NOTE — PATIENT INSTRUCTIONS
If you have any question regarding your ablation please contact Dr. Cool's Nurse at [phone].    Our  Amanda will call to schedule your procedure    Your procedure will take place at [facility].  [address], [place] [address]    Atrial Fibrillation Ablation Pre procedure   Instructions    Date:______________________________    Arrive at:__________________________    Procedure time:____________________      Pre-Procedure Instructions   You will need to fast (nothing to eat or drink) after midnight the day of your procedure.  Do NOT chew gum or eat mints the day of your procedure  Do NOT any medications the morning of the procedure.   If you are a diabetic and you take Lantus/Levemir only take ½ your normal dose the evening before.  You will need to complete pre procedure lab work 3-5 days prior to the procedure.  Do not use any lotions, creams or perfume the morning of procedure.   Please have a responsible adult to drive you home after procedure, you should go home same day, but there is always a possibility of an overnight stay.  Cath lab will provide you with all post procedure instructions  Follow up one month with Dr. Cool after the procedure and three months with his nurse practitioner.

## 2025-04-18 NOTE — TELEPHONE ENCOUNTER
Pre procedure instructions are located in the after visit summary. We discussed these instructions in detail prior to patient leaving appointment.     Procedure : AF Ablation

## 2025-04-24 PROBLEM — I48.0 PAROXYSMAL ATRIAL FIBRILLATION (HCC): Status: ACTIVE | Noted: 2025-04-18

## 2025-04-24 NOTE — TELEPHONE ENCOUNTER
Procedure: AFIB ABL    Date Of Procedure:  6/17/25    Time Of Arrival: 645AM    Procedure Time: 730AM       Called and spoke to patient and she is agreeable to the date and time. Reviewed the Pre-Procedure instructions and they verbalized understanding. Encouraged to call with any questions or concerns.       Published on [employer] / emailed to Cath lab / note in chart / scheduled in Epic/Cupid/Carto

## 2025-06-13 DIAGNOSIS — I48.0 PAF (PAROXYSMAL ATRIAL FIBRILLATION) (HCC): ICD-10-CM

## 2025-06-13 DIAGNOSIS — Z01.818 PREOP EXAMINATION: ICD-10-CM

## 2025-06-13 LAB
ANION GAP SERPL CALCULATED.3IONS-SCNC: 12 MMOL/L (ref 3–16)
BUN SERPL-MCNC: 19 MG/DL (ref 7–20)
CALCIUM SERPL-MCNC: 9.4 MG/DL (ref 8.3–10.6)
CHLORIDE SERPL-SCNC: 105 MMOL/L (ref 99–110)
CO2 SERPL-SCNC: 23 MMOL/L (ref 21–32)
CREAT SERPL-MCNC: 0.8 MG/DL (ref 0.6–1.1)
DEPRECATED RDW RBC AUTO: 14.7 % (ref 12.4–15.4)
GFR SERPLBLD CREATININE-BSD FMLA CKD-EPI: 87 ML/MIN/{1.73_M2}
GLUCOSE SERPL-MCNC: 98 MG/DL (ref 70–99)
HCT VFR BLD AUTO: 37.8 % (ref 36–48)
HGB BLD-MCNC: 13.2 G/DL (ref 12–16)
MAGNESIUM SERPL-MCNC: 2.11 MG/DL (ref 1.8–2.4)
MCH RBC QN AUTO: 29.8 PG (ref 26–34)
MCHC RBC AUTO-ENTMCNC: 34.9 G/DL (ref 31–36)
MCV RBC AUTO: 85.5 FL (ref 80–100)
PLATELET # BLD AUTO: 224 K/UL (ref 135–450)
PMV BLD AUTO: 8.8 FL (ref 5–10.5)
POTASSIUM SERPL-SCNC: 4.7 MMOL/L (ref 3.5–5.1)
RBC # BLD AUTO: 4.42 M/UL (ref 4–5.2)
SODIUM SERPL-SCNC: 140 MMOL/L (ref 136–145)
WBC # BLD AUTO: 7.7 K/UL (ref 4–11)

## 2025-06-17 ENCOUNTER — ANESTHESIA EVENT (OUTPATIENT)
Age: 55
End: 2025-06-17
Payer: COMMERCIAL

## 2025-06-17 ENCOUNTER — ANESTHESIA (OUTPATIENT)
Age: 55
End: 2025-06-17
Payer: COMMERCIAL

## 2025-06-17 ENCOUNTER — APPOINTMENT (OUTPATIENT)
Age: 55
End: 2025-06-17
Attending: INTERNAL MEDICINE
Payer: COMMERCIAL

## 2025-06-17 ENCOUNTER — HOSPITAL ENCOUNTER (OUTPATIENT)
Age: 55
Setting detail: OUTPATIENT SURGERY
Discharge: HOME | End: 2025-06-17
Attending: INTERNAL MEDICINE | Admitting: INTERNAL MEDICINE
Payer: COMMERCIAL

## 2025-06-17 VITALS
WEIGHT: 276 LBS | RESPIRATION RATE: 18 BRPM | OXYGEN SATURATION: 92 % | DIASTOLIC BLOOD PRESSURE: 80 MMHG | BODY MASS INDEX: 45.98 KG/M2 | HEART RATE: 81 BPM | TEMPERATURE: 97.7 F | HEIGHT: 65 IN | SYSTOLIC BLOOD PRESSURE: 120 MMHG

## 2025-06-17 DIAGNOSIS — I48.0 PAROXYSMAL ATRIAL FIBRILLATION (HCC): ICD-10-CM

## 2025-06-17 DIAGNOSIS — I48.0 PAF (PAROXYSMAL ATRIAL FIBRILLATION) (HCC): ICD-10-CM

## 2025-06-17 LAB
ECHO BSA: 2.4 M2
EKG ATRIAL RATE: 72 BPM
EKG ATRIAL RATE: 87 BPM
EKG DIAGNOSIS: NORMAL
EKG DIAGNOSIS: NORMAL
EKG P AXIS: 22 DEGREES
EKG P AXIS: 60 DEGREES
EKG P-R INTERVAL: 148 MS
EKG P-R INTERVAL: 190 MS
EKG Q-T INTERVAL: 390 MS
EKG Q-T INTERVAL: 400 MS
EKG QRS DURATION: 102 MS
EKG QRS DURATION: 98 MS
EKG QTC CALCULATION (BAZETT): 438 MS
EKG QTC CALCULATION (BAZETT): 469 MS
EKG R AXIS: -5 DEGREES
EKG R AXIS: -8 DEGREES
EKG T AXIS: 6 DEGREES
EKG T AXIS: 8 DEGREES
EKG VENTRICULAR RATE: 72 BPM
EKG VENTRICULAR RATE: 87 BPM
POC ACT LR: 302 SEC
POC ACT LR: 347 SEC
POC ACT LR: 347 SEC
POC ACT LR: 380 SEC

## 2025-06-17 PROCEDURE — C1733 CATH, EP, OTHR THAN COOL-TIP: HCPCS | Performed by: INTERNAL MEDICINE

## 2025-06-17 PROCEDURE — 85347 COAGULATION TIME ACTIVATED: CPT

## 2025-06-17 PROCEDURE — 93005 ELECTROCARDIOGRAM TRACING: CPT | Performed by: INTERNAL MEDICINE

## 2025-06-17 PROCEDURE — C1769 GUIDE WIRE: HCPCS | Performed by: INTERNAL MEDICINE

## 2025-06-17 PROCEDURE — 2500000003 HC RX 250 WO HCPCS: Performed by: NURSE ANESTHETIST, CERTIFIED REGISTERED

## 2025-06-17 PROCEDURE — C1759 CATH, INTRA ECHOCARDIOGRAPHY: HCPCS | Performed by: INTERNAL MEDICINE

## 2025-06-17 PROCEDURE — 6360000002 HC RX W HCPCS: Performed by: INTERNAL MEDICINE

## 2025-06-17 PROCEDURE — 7100000010 HC PHASE II RECOVERY - FIRST 15 MIN: Performed by: INTERNAL MEDICINE

## 2025-06-17 PROCEDURE — 7100000000 HC PACU RECOVERY - FIRST 15 MIN: Performed by: INTERNAL MEDICINE

## 2025-06-17 PROCEDURE — C1894 INTRO/SHEATH, NON-LASER: HCPCS | Performed by: INTERNAL MEDICINE

## 2025-06-17 PROCEDURE — C1766 INTRO/SHEATH,STRBLE,NON-PEEL: HCPCS | Performed by: INTERNAL MEDICINE

## 2025-06-17 PROCEDURE — 3700000001 HC ADD 15 MINUTES (ANESTHESIA): Performed by: INTERNAL MEDICINE

## 2025-06-17 PROCEDURE — 3700000000 HC ANESTHESIA ATTENDED CARE: Performed by: INTERNAL MEDICINE

## 2025-06-17 PROCEDURE — 93656 COMPRE EP EVAL ABLTJ ATR FIB: CPT | Performed by: INTERNAL MEDICINE

## 2025-06-17 PROCEDURE — C1760 CLOSURE DEV, VASC: HCPCS | Performed by: INTERNAL MEDICINE

## 2025-06-17 PROCEDURE — C1730 CATH, EP, 19 OR FEW ELECT: HCPCS | Performed by: INTERNAL MEDICINE

## 2025-06-17 PROCEDURE — 7100000011 HC PHASE II RECOVERY - ADDTL 15 MIN: Performed by: INTERNAL MEDICINE

## 2025-06-17 PROCEDURE — 2580000003 HC RX 258: Performed by: INTERNAL MEDICINE

## 2025-06-17 PROCEDURE — 6360000002 HC RX W HCPCS

## 2025-06-17 PROCEDURE — 2709999900 HC NON-CHARGEABLE SUPPLY: Performed by: INTERNAL MEDICINE

## 2025-06-17 PROCEDURE — 6360000002 HC RX W HCPCS: Performed by: ANESTHESIOLOGY

## 2025-06-17 PROCEDURE — 6370000000 HC RX 637 (ALT 250 FOR IP): Performed by: NURSE ANESTHETIST, CERTIFIED REGISTERED

## 2025-06-17 PROCEDURE — C1732 CATH, EP, DIAG/ABL, 3D/VECT: HCPCS | Performed by: INTERNAL MEDICINE

## 2025-06-17 PROCEDURE — 93010 ELECTROCARDIOGRAM REPORT: CPT | Performed by: INTERNAL MEDICINE

## 2025-06-17 PROCEDURE — 7100000001 HC PACU RECOVERY - ADDTL 15 MIN: Performed by: INTERNAL MEDICINE

## 2025-06-17 PROCEDURE — 6360000002 HC RX W HCPCS: Performed by: NURSE ANESTHETIST, CERTIFIED REGISTERED

## 2025-06-17 RX ORDER — SODIUM CHLORIDE 0.9 % (FLUSH) 0.9 %
5-40 SYRINGE (ML) INJECTION EVERY 12 HOURS SCHEDULED
Status: DISCONTINUED | OUTPATIENT
Start: 2025-06-17 | End: 2025-06-17 | Stop reason: HOSPADM

## 2025-06-17 RX ORDER — SODIUM CHLORIDE 0.9 % (FLUSH) 0.9 %
5-40 SYRINGE (ML) INJECTION PRN
Status: DISCONTINUED | OUTPATIENT
Start: 2025-06-17 | End: 2025-06-17 | Stop reason: HOSPADM

## 2025-06-17 RX ORDER — SODIUM CHLORIDE 9 MG/ML
INJECTION, SOLUTION INTRAVENOUS CONTINUOUS
Status: DISCONTINUED | OUTPATIENT
Start: 2025-06-17 | End: 2025-06-17 | Stop reason: HOSPADM

## 2025-06-17 RX ORDER — DIPHENHYDRAMINE HYDROCHLORIDE 50 MG/ML
12.5 INJECTION, SOLUTION INTRAMUSCULAR; INTRAVENOUS
Status: DISCONTINUED | OUTPATIENT
Start: 2025-06-17 | End: 2025-06-17 | Stop reason: HOSPADM

## 2025-06-17 RX ORDER — PROTAMINE SULFATE 10 MG/ML
INJECTION, SOLUTION INTRAVENOUS
Status: DISCONTINUED | OUTPATIENT
Start: 2025-06-17 | End: 2025-06-17 | Stop reason: SDUPTHER

## 2025-06-17 RX ORDER — MEPERIDINE HYDROCHLORIDE 25 MG/ML
12.5 INJECTION INTRAMUSCULAR; INTRAVENOUS; SUBCUTANEOUS EVERY 5 MIN PRN
Status: DISCONTINUED | OUTPATIENT
Start: 2025-06-17 | End: 2025-06-17 | Stop reason: HOSPADM

## 2025-06-17 RX ORDER — SUCCINYLCHOLINE CHLORIDE 20 MG/ML
INJECTION INTRAMUSCULAR; INTRAVENOUS
Status: DISCONTINUED | OUTPATIENT
Start: 2025-06-17 | End: 2025-06-17 | Stop reason: SDUPTHER

## 2025-06-17 RX ORDER — FENTANYL CITRATE 50 UG/ML
25 INJECTION, SOLUTION INTRAMUSCULAR; INTRAVENOUS EVERY 5 MIN PRN
Status: DISCONTINUED | OUTPATIENT
Start: 2025-06-17 | End: 2025-06-17 | Stop reason: HOSPADM

## 2025-06-17 RX ORDER — PROPOFOL 10 MG/ML
INJECTION, EMULSION INTRAVENOUS
Status: DISCONTINUED | OUTPATIENT
Start: 2025-06-17 | End: 2025-06-17 | Stop reason: SDUPTHER

## 2025-06-17 RX ORDER — SODIUM CHLORIDE 9 MG/ML
INJECTION, SOLUTION INTRAVENOUS PRN
Status: DISCONTINUED | OUTPATIENT
Start: 2025-06-17 | End: 2025-06-17 | Stop reason: HOSPADM

## 2025-06-17 RX ORDER — NALOXONE HYDROCHLORIDE 0.4 MG/ML
INJECTION, SOLUTION INTRAMUSCULAR; INTRAVENOUS; SUBCUTANEOUS PRN
Status: DISCONTINUED | OUTPATIENT
Start: 2025-06-17 | End: 2025-06-17 | Stop reason: HOSPADM

## 2025-06-17 RX ORDER — ONDANSETRON 2 MG/ML
INJECTION INTRAMUSCULAR; INTRAVENOUS
Status: DISCONTINUED | OUTPATIENT
Start: 2025-06-17 | End: 2025-06-17 | Stop reason: SDUPTHER

## 2025-06-17 RX ORDER — ONDANSETRON 2 MG/ML
4 INJECTION INTRAMUSCULAR; INTRAVENOUS
Status: COMPLETED | OUTPATIENT
Start: 2025-06-17 | End: 2025-06-17

## 2025-06-17 RX ORDER — LIDOCAINE HYDROCHLORIDE 20 MG/ML
INJECTION, SOLUTION INFILTRATION; PERINEURAL
Status: DISCONTINUED | OUTPATIENT
Start: 2025-06-17 | End: 2025-06-17 | Stop reason: SDUPTHER

## 2025-06-17 RX ORDER — GLYCOPYRROLATE 0.2 MG/ML
INJECTION INTRAMUSCULAR; INTRAVENOUS
Status: DISCONTINUED | OUTPATIENT
Start: 2025-06-17 | End: 2025-06-17 | Stop reason: SDUPTHER

## 2025-06-17 RX ORDER — OXYCODONE HYDROCHLORIDE 5 MG/1
5 TABLET ORAL
Status: DISCONTINUED | OUTPATIENT
Start: 2025-06-17 | End: 2025-06-17 | Stop reason: HOSPADM

## 2025-06-17 RX ORDER — GLUCAGON 1 MG/ML
1 KIT INJECTION PRN
Status: DISCONTINUED | OUTPATIENT
Start: 2025-06-17 | End: 2025-06-17 | Stop reason: HOSPADM

## 2025-06-17 RX ORDER — HYDROMORPHONE HYDROCHLORIDE 2 MG/ML
0.5 INJECTION, SOLUTION INTRAMUSCULAR; INTRAVENOUS; SUBCUTANEOUS EVERY 5 MIN PRN
Status: DISCONTINUED | OUTPATIENT
Start: 2025-06-17 | End: 2025-06-17 | Stop reason: HOSPADM

## 2025-06-17 RX ORDER — FENTANYL CITRATE 50 UG/ML
INJECTION, SOLUTION INTRAMUSCULAR; INTRAVENOUS
Status: DISCONTINUED | OUTPATIENT
Start: 2025-06-17 | End: 2025-06-17 | Stop reason: SDUPTHER

## 2025-06-17 RX ORDER — DEXTROSE MONOHYDRATE 100 MG/ML
INJECTION, SOLUTION INTRAVENOUS CONTINUOUS PRN
Status: DISCONTINUED | OUTPATIENT
Start: 2025-06-17 | End: 2025-06-17 | Stop reason: HOSPADM

## 2025-06-17 RX ORDER — HEPARIN SODIUM 200 [USP'U]/100ML
INJECTION, SOLUTION INTRAVENOUS
Status: DISCONTINUED | OUTPATIENT
Start: 2025-06-17 | End: 2025-06-17 | Stop reason: SDUPTHER

## 2025-06-17 RX ORDER — DROPERIDOL 2.5 MG/ML
0.62 INJECTION, SOLUTION INTRAMUSCULAR; INTRAVENOUS
Status: COMPLETED | OUTPATIENT
Start: 2025-06-17 | End: 2025-06-17

## 2025-06-17 RX ORDER — ONDANSETRON 2 MG/ML
INJECTION INTRAMUSCULAR; INTRAVENOUS
Status: COMPLETED
Start: 2025-06-17 | End: 2025-06-17

## 2025-06-17 RX ORDER — ROCURONIUM BROMIDE 10 MG/ML
INJECTION, SOLUTION INTRAVENOUS
Status: DISCONTINUED | OUTPATIENT
Start: 2025-06-17 | End: 2025-06-17 | Stop reason: SDUPTHER

## 2025-06-17 RX ORDER — DEXAMETHASONE SODIUM PHOSPHATE 4 MG/ML
INJECTION, SOLUTION INTRA-ARTICULAR; INTRALESIONAL; INTRAMUSCULAR; INTRAVENOUS; SOFT TISSUE
Status: DISCONTINUED | OUTPATIENT
Start: 2025-06-17 | End: 2025-06-17 | Stop reason: SDUPTHER

## 2025-06-17 RX ORDER — FLUCONAZOLE 100 MG/1
TABLET ORAL
Status: DISCONTINUED | OUTPATIENT
Start: 2025-06-17 | End: 2025-06-17 | Stop reason: SDUPTHER

## 2025-06-17 RX ADMIN — PHENYLEPHRINE HYDROCHLORIDE 100 MCG: 10 INJECTION INTRAVENOUS at 09:17

## 2025-06-17 RX ADMIN — ROCURONIUM BROMIDE 40 MG: 10 INJECTION, SOLUTION INTRAVENOUS at 07:52

## 2025-06-17 RX ADMIN — GLYCOPYRROLATE 0.4 MG: 0.2 INJECTION, SOLUTION INTRAMUSCULAR; INTRAVENOUS at 08:29

## 2025-06-17 RX ADMIN — PROPOFOL 100 MG: 10 INJECTION, EMULSION INTRAVENOUS at 08:38

## 2025-06-17 RX ADMIN — DROPERIDOL 0.62 MG: 2.5 INJECTION, SOLUTION INTRAMUSCULAR; INTRAVENOUS at 10:33

## 2025-06-17 RX ADMIN — PROTAMINE SULFATE 10 MG: 10 INJECTION, SOLUTION INTRAVENOUS at 09:18

## 2025-06-17 RX ADMIN — PHENYLEPHRINE HYDROCHLORIDE 100 MCG: 10 INJECTION INTRAVENOUS at 08:16

## 2025-06-17 RX ADMIN — FLUCONAZOLE 400 MG: 100 TABLET ORAL at 08:18

## 2025-06-17 RX ADMIN — FENTANYL CITRATE 50 MCG: 50 INJECTION, SOLUTION INTRAMUSCULAR; INTRAVENOUS at 07:38

## 2025-06-17 RX ADMIN — HEPARIN SODIUM IN SODIUM CHLORIDE 12000 UNITS: 200 INJECTION INTRAVENOUS at 08:13

## 2025-06-17 RX ADMIN — ONDANSETRON 4 MG: 2 INJECTION, SOLUTION INTRAMUSCULAR; INTRAVENOUS at 09:53

## 2025-06-17 RX ADMIN — ONDANSETRON 4 MG: 2 INJECTION INTRAMUSCULAR; INTRAVENOUS at 09:53

## 2025-06-17 RX ADMIN — ROCURONIUM BROMIDE 20 MG: 10 INJECTION, SOLUTION INTRAVENOUS at 09:06

## 2025-06-17 RX ADMIN — DEXAMETHASONE SODIUM PHOSPHATE 8 MG: 4 INJECTION, SOLUTION INTRAMUSCULAR; INTRAVENOUS at 07:52

## 2025-06-17 RX ADMIN — SODIUM CHLORIDE: 9 INJECTION, SOLUTION INTRAVENOUS at 07:27

## 2025-06-17 RX ADMIN — PROTAMINE SULFATE 10 MG: 10 INJECTION, SOLUTION INTRAVENOUS at 09:17

## 2025-06-17 RX ADMIN — FENTANYL CITRATE 50 MCG: 50 INJECTION, SOLUTION INTRAMUSCULAR; INTRAVENOUS at 09:20

## 2025-06-17 RX ADMIN — SUGAMMADEX 400 MG: 100 INJECTION, SOLUTION INTRAVENOUS at 09:30

## 2025-06-17 RX ADMIN — HEPARIN SODIUM IN SODIUM CHLORIDE 1000 UNITS: 200 INJECTION INTRAVENOUS at 08:59

## 2025-06-17 RX ADMIN — SUCCINYLCHOLINE CHLORIDE 120 MG: 20 INJECTION, SOLUTION INTRAMUSCULAR; INTRAVENOUS at 07:38

## 2025-06-17 RX ADMIN — ONDANSETRON 4 MG: 2 INJECTION INTRAMUSCULAR; INTRAVENOUS at 07:52

## 2025-06-17 RX ADMIN — PROPOFOL 150 MG: 10 INJECTION, EMULSION INTRAVENOUS at 07:38

## 2025-06-17 RX ADMIN — LIDOCAINE HYDROCHLORIDE 100 MG: 20 INJECTION, SOLUTION INFILTRATION; PERINEURAL at 07:38

## 2025-06-17 RX ADMIN — ROCURONIUM BROMIDE 10 MG: 10 INJECTION, SOLUTION INTRAVENOUS at 07:38

## 2025-06-17 RX ADMIN — PROTAMINE SULFATE 10 MG: 10 INJECTION, SOLUTION INTRAVENOUS at 09:20

## 2025-06-17 RX ADMIN — ROCURONIUM BROMIDE 30 MG: 10 INJECTION, SOLUTION INTRAVENOUS at 08:38

## 2025-06-17 RX ADMIN — HEPARIN SODIUM IN SODIUM CHLORIDE 3000 UNITS: 200 INJECTION INTRAVENOUS at 08:26

## 2025-06-17 ASSESSMENT — ENCOUNTER SYMPTOMS: SHORTNESS OF BREATH: 1

## 2025-06-17 ASSESSMENT — PAIN - FUNCTIONAL ASSESSMENT: PAIN_FUNCTIONAL_ASSESSMENT: NONE - DENIES PAIN

## 2025-06-17 NOTE — H&P
Cardiac Electrophysiology Consultation   Date:6/17/2025  Reason for Consultation: Atrial Fibrillation   Consult Requesting Physician: Mac Arvizu MD  Primary Care Physician: Hero Jasso MD     Chief Complaint:   Here for ablation.     HPI: Sherron Wright is a 54 y.o. patient with a history of paroxysmal atrial fibrillation.  She presented to the [facility] ED on 04/01/2024  reporting symptoms of chest pain / pressure, wet cough and diaphoresis, which woke her from her sleep. She checked her heart rhythm using the [employer] Mobile device and reported her heart rate was running between 150-160 bpm. EKG showed atrial fibrillation with rapid ventricular response.  Her heart rate came down to low 100s with 1 dose of Cardizem 10 mg IV. Chest x-ray was negative.  Her BNP was mildly elevated at 42.  Troponin and repeat troponin were negative.  COVID and influenza were negative. She was admitted to the hospital for new onset atrial fibrillation.  She was converted to normal sinus rhythm with bolus dose of Flecainide. She was discharged home on Flecainide 50 mg BID and Toprol XL 50 mg daily.    Seen in clinic as a new patient 05/22/2024 and reported symptoms of dyspnea on exertion and pressure in her neck. Reports when she was in atrial fibrillation at first she just felt weird then after about a day when heart rate was elevated in the 160's she was symptomatic with palpitations. She endorses orthostatic hypotension has cut her Toprol XL to 12.5 mg reports she is feeling terrible with lightheadedness.  She reports the in the fall an in January of 2024 she had similar symptom to the one she had when first diagnosed with atrial fibrillation. Discussed with the patient if she is to have breakthrough episodes, would change AAD or proceed with ablation.     Today, she presents to office for evaluation of atrial fibrillation. Reports she had a breakthrough episode 2 weeks ago in the middle of the night. States her HR was

## 2025-06-17 NOTE — PROGRESS NOTES
Dressing to R groin remains CDI with groin soft. R pedal pulse 2+. Patient denying numbness and tingling in RLE. Continues to deny pain. Will transfer patient to [facility] for ESTEFANY Landis to resume phase II care

## 2025-06-17 NOTE — PROGRESS NOTES
Pt admitted to PACU from cath lab, R groin CD&I soft around site. + pedal pulses. NSR on tele, nauseated upon arrival- zofran 4mg IV given. will monitor

## 2025-06-17 NOTE — ANESTHESIA POSTPROCEDURE EVALUATION
Department of Anesthesiology  Postprocedure Note    Patient: Sherron Wright  MRN: [id number]  YOB: 1970  Date of evaluation: 6/17/2025    Procedure Summary       Date: 06/17/25 Room / Location: Health system EP LAB 3 / [facility] CARDIAC CATH LAB    Anesthesia Start: 0727 Anesthesia Stop: 0954    Procedure: Ablation A-fib w complete ep study Diagnosis:       Paroxysmal atrial fibrillation (HCC)      (symptomatic paroxysmal atrial fibrillation despite flecainide.)    Providers: Bony Cool MD Responsible Provider: Ivan Ford MD    Anesthesia Type: general ASA Status: 4            Anesthesia Type: No value filed.    Reese Phase I: Reese Score: 10    Reese Phase II: Reese Score: 10    Anesthesia Post Evaluation    Patient location during evaluation: PACU  Patient participation: waiting for patient participation  Level of consciousness: responsive to physical stimuli  Pain score: 1  Airway patency: patent  Nausea & Vomiting: no nausea and no vomiting  Cardiovascular status: hemodynamically stable  Respiratory status: acceptable and nasal cannula  Hydration status: euvolemic  Pain management: adequate    There were no known notable events for this encounter.

## 2025-06-17 NOTE — PROGRESS NOTES
Bedrest completed pt up with nurse. R groin remains soft dressing CDI pt denies any pain. VSS discharge instructions reviewed with pt and  all questions answered. Pt off unit at this time.

## 2025-06-17 NOTE — PROGRESS NOTES
Pt on unit from pacu report received at bedside. Pt denies any pain VSS  R groin soft dressing CDI R pedal pulse present husbamd at bedside call light in reach. Pt tolerating fliuds well free of nausea

## 2025-06-17 NOTE — ANESTHESIA PRE PROCEDURE
Department of Anesthesiology  Preprocedure Note       Name:  Sherron Wright   Age:  54 y.o.  :  1970                                          MRN:  [id number]         Date:  2025      Surgeon: Surgeon(s):  Bony Cool MD    Procedure: Procedure(s):  Ablation A-fib w complete ep study    Medications prior to admission:   Prior to Admission medications    Medication Sig Start Date End Date Taking? Authorizing Provider   torsemide (DEMADEX) 10 MG tablet Take 1 tablet by mouth daily 10mg on  ,W,F alternate with Spironolactone 25mg on T,TH 25   Nolberto, Dallin PANIAGUA MD   atorvastatin (LIPITOR) 40 MG tablet Take 1 tablet by mouth nightly 1/15/25   Nolberto, Dallin PANIAGUA MD   flecainide (TAMBOCOR) 50 MG tablet Take 1 tablet by mouth every 12 hours 1/15/25   Nolberto, Dallin PANIAGUA MD   metoprolol succinate (TOPROL XL) 25 MG extended release tablet Take 2 tablets by mouth daily 1/15/25   Nolberto, Dallin PANIAGUA MD   midodrine (PROAMATINE) 5 MG tablet Take 1 tablet by mouth 3 times daily 1/15/25   Nolberto, Dallin PANIAGUA MD   potassium chloride (KLOR-CON M) 20 MEQ extended release tablet Take 1 tablet by mouth daily 1/15/25   Nolberto, Dallin PANIAGUA MD   esomeprazole Magnesium (NEXIUM) 20 MG PACK Take 1 packet by mouth daily  Patient not taking: Reported on 2025    Beto Lamar MD   Doxylamine Succinate, Sleep, (UNISOM PO) Take 1 tablet by mouth nightly as needed (insomnia)    Beto Lamar MD   b complex vitamins capsule Take 1 capsule by mouth daily    Beto Lamar MD   Cholecalciferol (VITAMIN D-3 PO) Take by mouth daily    Beto Lamar MD   Omega-3 Fatty Acids (OMEGA-3 FISH OIL PO) Take 1 tablet by mouth daily    Beto Lamar MD   MAGNESIUM PO Take by mouth nightly    Beto Lamar MD       Current medications:    No current facility-administered medications for this encounter.       Allergies:  No Known Allergies    Problem List:    Patient Active Problem List   Diagnosis Code    Cellulitis L03.90

## 2025-06-17 NOTE — PROGRESS NOTES
Pt HOB elevated,  at bedside, R groin CD&I- soft around site, neurovascular status intact. Vss, NSR on tele, phase 1 discharge criteria met

## 2025-06-17 NOTE — DISCHARGE INSTRUCTIONS
.ABLATION DISCHARGE INSTRUCTIONS    Care of your puncture site:  Remove bandage 24 hours after the procedure.  May shower in 24 hours but do not sit in a bathtub/pool of water for 3 days or until the wound is healed.  Inspect the site daily and gently clean using soap and water while standing in the shower.  Dry thoroughly and apply a Band-Aid that covers the entire site. Do not apply powder or lotion.    Normal Observations:  Soreness or tenderness which may last one week.  Mild oozing from the incision site.  Possible bruising that could last 2 weeks.    Activity:  You may resume driving 24 hours following the procedure.  You may resume normal activity in 3 days or after the wound heals.  Avoid lifting more than 10 pounds for 3 days or until the wound heals.  Avoid strenuous exercise or activity for 1 week.      Nutrition:  Regular diet       Call your doctor immediately if your condition worsens, for any other concerns, for a follow-up appointment or if you experience any of the following:  Significant bleeding that does not stop after 10 minutes of applying firm pressure on the puncture site.  Increased swelling on the groin or leg.  Unusual pain, numbness, or tingling of the groin or down the leg.  Any signs of infection such as: redness, yellow drainage at the site, swelling or pain.

## 2025-06-19 ENCOUNTER — TELEPHONE (OUTPATIENT)
Dept: CARDIOLOGY CLINIC | Age: 55
End: 2025-06-19

## 2025-06-19 NOTE — TELEPHONE ENCOUNTER
Sherron Wright was called in regards to recent ablation.     Date of Procedure:6/18/2025    Date of Call: 6/19/2025    Patient denies any complaints at this time except with Eliquis which caused facial flushing, she will send another message if this occurs again     Sore throat or difficulty swallowing? (This is normal for a couple of days post procedure - if it continues, we want to know).    Ongoing CP? (This is normal for a couple of days post procedure - if it continues, we want to know)    Ongoing SOB? Any s/s of heart failure? Swelling in feet ankles or abdomen? Unable to lie flat in bed? SOB with exertion or all the time?    How does the groin look? Any swelling, bleeding or drainage? (A small lump is ok.)    Remind no heavy lifting >10# for 7 days post procedure.     Discussed blanking period and that it is not abnormal to have intermittent episodes of atrial fibrillation. Advised patient to call if they develop prolonged episodes > 24 hours or new onset of symptoms.      Follow up appointment made with the [facility] 7/16/2025 @ 3 pm

## 2025-07-09 NOTE — PROGRESS NOTES
Cardiac Electrophysiology Consultation   Date: 7/16/2025   Reason for Consultation: Atrial Fibrillation   Consult Requesting Physician: Mac Arvizu MD  Primary Care Physician: Hero Jasso MD     Chief Complaint:   Chief Complaint   Patient presents with    Follow-up     Ablation f/u, no cc      HPI: Sherron Wright is a 54 y.o. patient with a history of paroxysmal atrial fibrillation.  She presented to the [facility] ED on 04/01/2024  reporting symptoms of chest pain / pressure, wet cough and diaphoresis, which woke her from her sleep. She checked her heart rhythm using the [employer] Mobile device and reported her heart rate was running between 150-160 bpm. EKG showed atrial fibrillation with rapid ventricular response.  Her heart rate came down to low 100s with 1 dose of Cardizem 10 mg IV. Chest x-ray was negative.  Her BNP was mildly elevated at 42.  Troponin and repeat troponin were negative.  COVID and influenza were negative. She was admitted to the hospital for new onset atrial fibrillation.  She was converted to normal sinus rhythm with bolus dose of Flecainide. She was discharged home on Flecainide 50 mg BID and Toprol XL 50 mg daily.    Seen in clinic as a new patient 05/22/2024 and reported symptoms of dyspnea on exertion and pressure in her neck. Reports when she was in atrial fibrillation at first she just felt weird then after about a day when heart rate was elevated in the 160's she was symptomatic with palpitations. She endorsed orthostatic hypotension and cut her Toprol XL to 12.5 mg reports she is feeling terrible with lightheadedness.  She reports the in the fall an in January of 2024 she had similar symptom to the one she had when first diagnosed with atrial fibrillation. Discussed with the patient if she is to have breakthrough episodes, would change AAD or proceed with ablation.     Seen in clinic 04/18/2025 and reported breakthrough episodes 2 weeks prior in the middle of the night. Noted

## 2025-07-16 ENCOUNTER — OFFICE VISIT (OUTPATIENT)
Dept: CARDIOLOGY CLINIC | Age: 55
End: 2025-07-16
Payer: COMMERCIAL

## 2025-07-16 VITALS
WEIGHT: 271.8 LBS | OXYGEN SATURATION: 99 % | SYSTOLIC BLOOD PRESSURE: 110 MMHG | BODY MASS INDEX: 45.29 KG/M2 | HEART RATE: 65 BPM | DIASTOLIC BLOOD PRESSURE: 60 MMHG | HEIGHT: 65 IN

## 2025-07-16 DIAGNOSIS — I48.91 NEW ONSET ATRIAL FIBRILLATION (HCC): ICD-10-CM

## 2025-07-16 DIAGNOSIS — I50.30 DIASTOLIC HEART FAILURE, UNSPECIFIED HF CHRONICITY (HCC): ICD-10-CM

## 2025-07-16 DIAGNOSIS — I49.9 IRREGULAR HEART RATE: ICD-10-CM

## 2025-07-16 DIAGNOSIS — I48.0 PAROXYSMAL ATRIAL FIBRILLATION (HCC): Primary | ICD-10-CM

## 2025-07-16 DIAGNOSIS — I50.32 CHRONIC DIASTOLIC HEART FAILURE (HCC): ICD-10-CM

## 2025-07-16 PROCEDURE — G2211 COMPLEX E/M VISIT ADD ON: HCPCS | Performed by: INTERNAL MEDICINE

## 2025-07-16 PROCEDURE — 99214 OFFICE O/P EST MOD 30 MIN: CPT | Performed by: INTERNAL MEDICINE

## 2025-07-16 PROCEDURE — 93000 ELECTROCARDIOGRAM COMPLETE: CPT | Performed by: INTERNAL MEDICINE

## 2025-07-17 DIAGNOSIS — I48.0 PAROXYSMAL ATRIAL FIBRILLATION (HCC): Primary | ICD-10-CM

## 2025-07-17 RX ORDER — APIXABAN 5 MG/1
5 TABLET, FILM COATED ORAL 2 TIMES DAILY
Qty: 60 TABLET | Refills: 0 | Status: SHIPPED | OUTPATIENT
Start: 2025-07-17

## 2025-07-17 NOTE — TELEPHONE ENCOUNTER
Medication Requested: Eliquis  Preferred Pharmacy: [facility]  Last OV: 2025 [facility]  Next OV: N/A  Labs/[id number] CBC

## 2025-07-18 ENCOUNTER — PATIENT MESSAGE (OUTPATIENT)
Dept: CARDIOLOGY CLINIC | Age: 55
End: 2025-07-18

## 2025-08-11 ENCOUNTER — PATIENT MESSAGE (OUTPATIENT)
Dept: CARDIOLOGY CLINIC | Age: 55
End: 2025-08-11

## 2025-08-11 DIAGNOSIS — I48.0 PAROXYSMAL ATRIAL FIBRILLATION (HCC): ICD-10-CM

## (undated) DEVICE — CATH LAB PACK: Brand: MEDLINE INDUSTRIES, INC.

## (undated) DEVICE — INTRODUCER SHTH DIA8FR CANN L23CM BLU VASC CATH W/O MINI

## (undated) DEVICE — PERCLOSE™ PROSTYLE™ SUTURE-MEDIATED CLOSURE AND REPAIR SYSTEM: Brand: PERCLOSE™ PROSTYLE™

## (undated) DEVICE — ELECTRODE PT RET AD L9FT HI MOIST COND ADH HYDRGEL CORDED

## (undated) DEVICE — DILATOR: Brand: COOK

## (undated) DEVICE — PULSED FIELD ABLATION CATHETER: Brand: FARAWAVE™

## (undated) DEVICE — 3M™ RED DOT™ REPOSITIONABLE MONITORING ELECTRODE 2670-5, 5/BAG, 200/CASE, 54/PLT: Brand: RED DOT™

## (undated) DEVICE — 1 X VERSACROSS CONNECT TRANSSEPTAL DILATOR;  1 X VERSACROSS RF WIRE (INCLUDING 1 X CONNECTOR CABLE (SINGLE USE)): Brand: VERSACROSS CONNECT ACCESS SOLUTION FOR FARADRIVE

## (undated) DEVICE — TUBING PMP FOR CARTO SYS SMARTABLATE

## (undated) DEVICE — GUIDEWIRE VASC L180CM 0035IN 15MM J ROSEN TIP PTFE FIX COR

## (undated) DEVICE — CATHETER US 8FR L90CM GRN TIP OVERLAY FOR GE-VIVID I VIVID

## (undated) DEVICE — CATHETER EP 5FR L110CM ELECTRD TIP L1MM SPC 2-5-2MM M CRV

## (undated) DEVICE — CATHETER MAP D CRV 3-3-3-3-3 MM SPC GALAXY OCTARAY

## (undated) DEVICE — STEERABLE SHEATH CLEAR: Brand: FARADRIVE™

## (undated) DEVICE — INTRODUCER SHTH DIA6FR CANN L23CM GRN VASC CATH W/O MINI

## (undated) DEVICE — PROBE COVER KIT: Brand: MEDLINE INDUSTRIES, INC.

## (undated) DEVICE — PAD, DEFIB, ADULT, RADIOTRANS, PHYSIO: Brand: MEDLINE

## (undated) DEVICE — CATHETER CONNECTION CABLE: Brand: FARASTAR™

## (undated) DEVICE — WIRE GUID DIAG PTFE COAT FIX COR 3MM J TIP .035INX80CM